# Patient Record
Sex: MALE | Race: WHITE | NOT HISPANIC OR LATINO | URBAN - METROPOLITAN AREA
[De-identification: names, ages, dates, MRNs, and addresses within clinical notes are randomized per-mention and may not be internally consistent; named-entity substitution may affect disease eponyms.]

---

## 2017-02-13 DIAGNOSIS — F64.0 GENDER IDENTITY DISORDER IN ADOLESCENTS OR ADULTS: ICD-10-CM

## 2017-02-13 RX ORDER — SPIRONOLACTONE 100 MG/1
200 TABLET, FILM COATED ORAL DAILY
Qty: 60 TABLET | Refills: 1 | Status: SHIPPED | OUTPATIENT
Start: 2017-02-13 | End: 2017-06-21

## 2017-02-20 ENCOUNTER — OFFICE VISIT (OUTPATIENT)
Dept: OTHER | Facility: OUTPATIENT CENTER | Age: 67
End: 2017-02-20

## 2017-02-20 VITALS
SYSTOLIC BLOOD PRESSURE: 121 MMHG | HEART RATE: 68 BPM | BODY MASS INDEX: 27.8 KG/M2 | DIASTOLIC BLOOD PRESSURE: 74 MMHG | HEIGHT: 68 IN | WEIGHT: 183.4 LBS

## 2017-02-20 DIAGNOSIS — F64.0 GENDER IDENTITY DISORDER IN ADOLESCENTS OR ADULTS: Primary | ICD-10-CM

## 2017-02-20 ASSESSMENT — ENCOUNTER SYMPTOMS
PALPITATIONS: 0
VOMITING: 0
LIGHT-HEADEDNESS: 0
NUMBNESS: 0
POLYDIPSIA: 0
WEAKNESS: 0
HEADACHES: 0
ABDOMINAL PAIN: 0
NERVOUS/ANXIOUS: 0
FREQUENCY: 0
CHILLS: 0
DYSPHORIC MOOD: 0
UNEXPECTED WEIGHT CHANGE: 0
FEVER: 0
CHEST TIGHTNESS: 0
SHORTNESS OF BREATH: 0

## 2017-02-20 ASSESSMENT — PAIN SCALES - GENERAL: PAINLEVEL: NO PAIN (0)

## 2017-02-20 NOTE — MR AVS SNAPSHOT
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      Who to contact     Please call your clinic at [phone] to:    Ask questions about your health    Make or cancel appointments    Discuss your medicines    Learn about your test results    Speak to your doctor   If you have compliments or concerns about an experience at your clinic, or if you wish to file a complaint, please contact [facility] Patient Relations at [phone] or email us at [email or web address]         Additional Information About Your Visit        MyChart Information     AltraVaxt is an electronic gateway that provides easy, online access to your medical records. With [place] [facility], you can request a clinic appointment, read your test results, renew a prescription or communicate with your care team.     To sign up for AltraVaxt visit the website at [email or web address]   You will be asked to enter the access code listed below, as well as some personal information. Please follow the directions to create your username and password.     Your access code is: [email or web address]  Expires: 2017  7:59 AM     Your access code will  in 90 days. If you need help or a new code, please contact your [facility] or call [phone] for assistance.        Care EveryWhere ID     This is your Care EveryWhere ID. This could be used by other organizations to access your [place] medical records  [id number]        Your Vitals Were     Pulse Height BMI (Body Mass  "Index)             68 1.727 m (5' 8\") 27.89 kg/m2          Blood Pressure from Last 3 Encounters:   02/20/17 121/74   05/26/16 123/76   12/31/15 112/70    Weight from Last 3 Encounters:   02/20/17 83.2 kg (183 lb 6.4 oz)   05/26/16 82.1 kg (181 lb)   12/31/15 82.1 kg (181 lb)                 Today's Medication Changes          These changes are accurate as of: 2/20/17 11:59 PM.  If you have any questions, ask your nurse or doctor.               These medicines have changed or have updated prescriptions.        Dose/Directions    simvastatin 40 MG tablet   Commonly known as:  ZOCOR   This may have changed:  how much to take   Used for:  Hypercholesterolemia        Dose:  40 mg   Take 1 tablet (40 mg) by mouth At Bedtime   Quantity:  30 tablet   Refills:  1                Primary Care Provider Office Phone # Fax #    Brady Chavez [phone]       No primary provider on file.        Thank you!     Thank you for choosing [place] [facility]  for your care. Our goal is always to provide you with excellent care. Hearing back from our patients is one way we can continue to improve our services. Please take a few minutes to complete the written survey that you may receive in the mail after your visit with us. Thank you!             Your Updated Medication List - Protect others around you: Learn how to safely use, store and throw away your medicines at www.disposemymeds.org.          This list is accurate as of: 2/20/17 11:59 PM.  Always use your most recent med list.                   Brand Name Dispense Instructions for use    estradiol 0.1 MG/24HR BIW patch    MINIVELLE    24 patch    Place 1 patch onto the skin twice a week       IMITREX PO          simvastatin 40 MG tablet    ZOCOR    30 tablet    Take 1 tablet (40 mg) by mouth At Bedtime       spironolactone 100 MG tablet    ALDACTONE    60 tablet    Take 2 tablets (200 mg) by mouth daily         "

## 2017-02-20 NOTE — NURSING NOTE
"Chief Complaint   Patient presents with     RECHECK       Vitals:    02/20/17 1441   BP: 121/74   Cuff Size: Adult Large   Pulse: 68   Weight: 83.2 kg (183 lb 6.4 oz)   Height: 1.727 m (5' 8\")       Body mass index is 27.89 kg/(m^2).      Shagufta Vicente CMA                        "

## 2017-02-20 NOTE — PROGRESS NOTES
HPI     Ami is a 66 year old individual that uses pronouns She/Her/Hers/Herself that presents today for follow up of:  feminizing hormone therapy.   Gender identity: female .   Started Hormone  therapy  11/2013  Continues on     Vivelle dot 0.1 mg patch 2x/wk    [id number]  mg daily       Any special concerns today?  BP at home running 106-18/73-94; if she feels like bp is running low, will take 100 mg spironolactone rather than 200 mg.   Feels great.   On hormones?  YES +++ Shot day of the week? Not applicable-taking pills/patch/gel      Due for labs?  Yes      +++ Refills of meds needed?  Yes  Gender related body changes since last visit: Skin softer, no much change    Activity: Walking, daily--30-60 min  New health concerns since last visit: No new health concerns since last visit  ---    No past surgical history on file.    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     HTN (hypertension)     Hypercholesterolemia       Current Outpatient Prescriptions   Medication Sig Dispense Refill     spironolactone (ALDACTONE) 100 MG tablet Take 2 tablets (200 mg) by mouth daily 60 tablet 1     estradiol (MINIVELLE) 0.1 MG/24HR patch Place 1 patch onto the skin twice a week 24 patch 3     simvastatin (ZOCOR) 40 MG tablet Take 1 tablet (40 mg) by mouth At Bedtime (Patient taking differently: Take 20 mg by mouth At Bedtime ) 30 tablet 1     SUMAtriptan Succinate (IMITREX PO)          History   Smoking Status     Former Smoker     Packs/day: 0.30   Smokeless Tobacco     Not on file        No Known Allergies    Health Maintenance Due   Topic Date Due     TETANUS IMMUNIZATION (SYSTEM ASSIGNED)  05/05/1969     ADVANCE DIRECTIVE PLANNING Q5 YRS (NO INBASKET)  05/05/1969     HEPATITIS C SCREENING  05/05/1969     COLON CANCER SCREEN (SYSTEM ASSIGNED)  05/05/2001     Mental Health Tx Plan Q11 MOS  06/27/2015     FALL RISK ASSESSMENT  05/05/2016      "PNEUMOCOCCAL (1 of 2 - PCV13) 05/05/2016     AORTIC ANEURYSM SCREENING (SYSTEM ASSIGNED)  05/05/2016     INFLUENZA VACCINE (SYSTEM ASSIGNED)  09/01/2016         Problem, Medication and Allergy Lists were reviewed and are current..         Review of Systems:   Review of Systems   Constitutional: Negative for chills, fever and unexpected weight change.   Eyes: Negative for visual disturbance.   Respiratory: Negative for chest tightness and shortness of breath.    Cardiovascular: Negative for chest pain, palpitations and leg swelling.   Gastrointestinal: Negative for abdominal pain and vomiting.   Endocrine: Negative for polydipsia and polyuria.   Genitourinary: Negative for frequency.   Neurological: Negative for weakness, light-headedness, numbness and headaches.   Psychiatric/Behavioral: Negative for dysphoric mood and suicidal ideas. The patient is not nervous/anxious.             Physical Exam:     Vitals:    02/20/17 1441   BP: 121/74   Cuff Size: Adult Large   Pulse: 68   Weight: 83.2 kg (183 lb 6.4 oz)   Height: 1.727 m (5' 8\")     BMI= Body mass index is 27.89 kg/(m^2).   Wt Readings from Last 10 Encounters:   02/20/17 83.2 kg (183 lb 6.4 oz)   05/26/16 82.1 kg (181 lb)   12/31/15 82.1 kg (181 lb)   05/27/15 79.8 kg (176 lb)   01/22/15 78 kg (172 lb)   08/21/14 76.7 kg (169 lb)   05/29/14 77.1 kg (170 lb)   03/27/14 80.3 kg (177 lb)   12/26/13 78 kg (172 lb)   11/27/13 78.5 kg (173 lb)     Appearance: Female appearance and dress    GENERAL:: healthy, alert and no distress  RESP: lungs clear to auscultation - no rales, no rhonchi, no wheezes  CV: regular rates and rhythm, normal S1 S2, no S3 or S4 and no murmur, no click or rub -  / Breast, hips per flowsheet    Affect: Appropriate/mood-congruent           Labs:   Results from last visit:  Orders Only on 06/02/2016   Component Date Value Ref Range Status     Testosterone Total 06/02/2016 15* 240 - 950 ng/dL Final    Comment: This test was developed and its " performance characteristics determined by the   [facility],  Special Chemistry Laboratory. It has   not been cleared or approved by the FDA. The laboratory is regulated under   CLIA   as qualified to perform high-complexity testing. This test is used for   clinical   purposes. It should not be regarded as investigational or for research.       Sex Hormone Binding Globulin 06/02/2016 68  11 - 80 nmol/L Final     Free Testosterone Calculated 06/02/2016 0.15* 4.7 - 24.4 ng/dL Final     Glucose 06/02/2016 95.0  60.0 - 109.0 mg/dL Final     Urea Nitrogen 06/02/2016 17.0  7.0 - 30.0 mg/dL Final     Calcium 06/02/2016 8.9  8.5 - 10.4 mg/dL Final     Creatinine 06/02/2016 1.4  0.8 - 1.5 mg/dL Final     eGFR Calculated (Non Black Referen* 06/02/2016 54.1* >60.0 Final     eGFR Calculated (Black Reference) 06/02/2016 65.4  >60.0 Final     Sodium 06/02/2016 137.0  133.0 - 144.0 mmol/L Final     Potassium 06/02/2016 4.4  3.4 - 5.3 mmol/L Final     Chloride 06/02/2016 102.0  94.0 - 109.0 mmol/L Final     Carbon Dioxide 06/02/2016 26.0  20.0 - 32.0 mmol/L Final     Albumin 06/02/2016 3.9  3.2 - 4.5 g/dL Final     Alkaline Phosphatase 06/02/2016 76.0  40.0 - 150.0 U/L Final     ALT 06/02/2016 22.0  0.0 - 70.0 U/L Final     AST 06/02/2016 24.0  0.0 - 55.0 U/L Final     Bilirubin Total 06/02/2016 0.7  0.2 - 1.3 mg/dL Final     Protein Total 06/02/2016 7.6  6.8 - 8.8 g/dL Final     FASTING SPECIMEN 06/02/2016 Yes    Final     Cholesterol 06/02/2016 166.0  0.0 - 200.0 Final     HDL Cholesterol 06/02/2016 44.0  >40.0 Final     Triglycerides 06/02/2016 162.0* 0.0 - 150.0 Final     Cholesterol/HDL Ratio 06/02/2016 3.8  0.0 - 5.0 Final     LDL Cholesterol Direct 06/02/2016 90.0  0.0 - 129.0 Final     VLDL-Cholesterol 06/02/2016 32.0  7.0 - 32.0 Final       Assessment and Plan     1. Gender dysphoria  Doing well on current hormone regimen. Discussed orchiectomy as a surgical option for patient, as she feels  vaginoplasty would be more surgically complex and invasive than she wants for herself at this point.       Health Maintenance:  Mammogram             When:never, counseled to start in next year    Follow up:  Follow up in 6 months.  Results by javier  Questions were elicited and answered.     Anish Xiao MD

## 2017-05-08 DIAGNOSIS — F64.0 GENDER IDENTITY DISORDER IN ADOLESCENTS OR ADULTS: ICD-10-CM

## 2017-05-08 RX ORDER — ESTRADIOL 0.1 MG/D
1 FILM, EXTENDED RELEASE TRANSDERMAL
Qty: 24 PATCH | Refills: 3 | Status: SHIPPED | OUTPATIENT
Start: 2017-05-08 | End: 2017-08-31

## 2017-06-13 ENCOUNTER — TELEPHONE (OUTPATIENT)
Dept: OTHER | Facility: OUTPATIENT CENTER | Age: 67
End: 2017-06-13

## 2017-06-21 DIAGNOSIS — F64.0 GENDER IDENTITY DISORDER IN ADOLESCENTS OR ADULTS: ICD-10-CM

## 2017-06-21 RX ORDER — SPIRONOLACTONE 100 MG/1
200 TABLET, FILM COATED ORAL DAILY
Qty: 60 TABLET | Refills: 1 | Status: SHIPPED | OUTPATIENT
Start: 2017-06-21 | End: 2017-08-31

## 2017-08-31 ENCOUNTER — OFFICE VISIT (OUTPATIENT)
Dept: OTHER | Facility: OUTPATIENT CENTER | Age: 67
End: 2017-08-31

## 2017-08-31 VITALS
HEART RATE: 63 BPM | BODY MASS INDEX: 28.79 KG/M2 | WEIGHT: 190 LBS | DIASTOLIC BLOOD PRESSURE: 76 MMHG | SYSTOLIC BLOOD PRESSURE: 137 MMHG | HEIGHT: 68 IN

## 2017-08-31 DIAGNOSIS — F64.0 GENDER IDENTITY DISORDER IN ADOLESCENTS OR ADULTS: ICD-10-CM

## 2017-08-31 RX ORDER — ESTRADIOL 0.1 MG/D
1 FILM, EXTENDED RELEASE TRANSDERMAL
Qty: 24 PATCH | Refills: 3 | Status: SHIPPED | OUTPATIENT
Start: 2017-08-31 | End: 2018-04-16

## 2017-08-31 RX ORDER — SPIRONOLACTONE 100 MG/1
100 TABLET, FILM COATED ORAL DAILY
Qty: 90 TABLET | Refills: 1 | Status: SHIPPED | OUTPATIENT
Start: 2017-08-31 | End: 2018-04-16

## 2017-08-31 ASSESSMENT — ENCOUNTER SYMPTOMS
CHEST TIGHTNESS: 0
VOMITING: 0
DYSPHORIC MOOD: 0
FEVER: 0
ABDOMINAL PAIN: 0
NERVOUS/ANXIOUS: 0
CHILLS: 0
WEAKNESS: 0
HEADACHES: 0
FREQUENCY: 0
POLYDIPSIA: 0
LIGHT-HEADEDNESS: 0
SHORTNESS OF BREATH: 0
PALPITATIONS: 0
NUMBNESS: 0
UNEXPECTED WEIGHT CHANGE: 1

## 2017-08-31 NOTE — PROGRESS NOTES
HPI     Ami is a 67 year old individual that uses pronouns She/Her/Hers/Herself that presents today for follow up of:  feminizing hormone therapy.   Gender identity: female.   Started Hormone  therapy  *11/2013  Continues on    Vivelle dot 0.1 mg patch 2x/wk    Spironlactone 100* mg daily        Any special concerns today?  concerns about:  Pt. Only taking 100 mg spironolactone as was getting lightheaded, bp was a little low, told to decrease to 100 mg by either me or PCP, this change 1 year ago.   Had Mammogram and was told was fine, but never got a result letter; and result of study was never sent to [facility] 7/2017 ([facility])  She forgot to do labs ordered from last visit. Otherwise doing well.    On hormones?  YES +++ Shot day of the week? Not applicable-taking pills/patch/gel      Due for labs?  Yes      +++ Refills of meds needed?  Yes  Gender related body changes since last visit: Heavier in hip area; weight gain--cooking more, may be eating more.     Activity: walk as much possible; mowing lawn. Daily walk 20-60 min  New health concerns since last visit: none  ---    No past surgical history on file.    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     HTN (hypertension)     Hypercholesterolemia       Current Outpatient Prescriptions   Medication Sig Dispense Refill     spironolactone (ALDACTONE) 100 MG tablet Take 2 tablets (200 mg) by mouth daily 60 tablet 1     estradiol (MINIVELLE) 0.1 MG/24HR BIW patch Place 1 patch onto the skin twice a week 24 patch 3     simvastatin (ZOCOR) 40 MG tablet Take 1 tablet (40 mg) by mouth At Bedtime (Patient taking differently: Take 20 mg by mouth At Bedtime ) 30 tablet 1     SUMAtriptan Succinate (IMITREX PO)          History   Smoking Status     Former Smoker     Packs/day: 0.30   Smokeless Tobacco     Not on file        No Known Allergies    Health Maintenance Due   Topic Date Due     TETANUS  "IMMUNIZATION (SYSTEM ASSIGNED)  05/05/1969     HEPATITIS C SCREENING  05/05/1969     MAMMO Q1 YR  05/05/1970     COLON CANCER SCREEN (SYSTEM ASSIGNED)  05/05/2001     ADVANCE DIRECTIVE PLANNING Q5 YRS  05/05/2006     Mental Health Tx Plan Q11 MOS  06/27/2015     FALL RISK ASSESSMENT  05/05/2016     PNEUMOCOCCAL (1 of 2 - PCV13) 05/05/2016     AORTIC ANEURYSM SCREENING (SYSTEM ASSIGNED)  05/05/2016         Problem, Medication and Allergy Lists were reviewed and are current..         Review of Systems:   Review of Systems   Constitutional: Positive for unexpected weight change. Negative for chills and fever.   Eyes: Negative for visual disturbance.   Respiratory: Negative for chest tightness and shortness of breath.    Cardiovascular: Negative for chest pain, palpitations and leg swelling.   Gastrointestinal: Negative for abdominal pain and vomiting.   Endocrine: Negative for polydipsia and polyuria.   Genitourinary: Negative for frequency.   Neurological: Negative for weakness, light-headedness, numbness and headaches.        Unchanged migraines, not needing Imitrex   Psychiatric/Behavioral: Negative for dysphoric mood and suicidal ideas. The patient is not nervous/anxious.             Physical Exam:     Vitals:    08/31/17 1351   BP: 137/76   Pulse: 63   Weight: 86.2 kg (190 lb)   Height: 1.727 m (5' 8\")     BMI= Body mass index is 28.89 kg/(m^2).   Wt Readings from Last 10 Encounters:   08/31/17 86.2 kg (190 lb)   02/20/17 83.2 kg (183 lb 6.4 oz)   05/26/16 82.1 kg (181 lb)   12/31/15 82.1 kg (181 lb)   05/27/15 79.8 kg (176 lb)   01/22/15 78 kg (172 lb)   08/21/14 76.7 kg (169 lb)   05/29/14 77.1 kg (170 lb)   03/27/14 80.3 kg (177 lb)   12/26/13 78 kg (172 lb)     Appearance: Female appearance and dress    GENERAL:: healthy, alert and no distress  RESP: lungs clear to auscultation - no rales, no rhonchi, no wheezes  CV: regular rates and rhythm, normal S1 S2, no S3 or S4 and no murmur, no click or rub -  Voice, " skin/ Breast, hips per flowsheet    Affect: Appropriate/mood-congruent           Labs:   Results from last visit:  Orders Only on 06/02/2016   Component Date Value Ref Range Status     Testosterone Total 06/02/2016 15* 240 - 950 ng/dL Final    Comment: This test was developed and its performance characteristics determined by the   [facility],  Special Chemistry Laboratory. It has   not been cleared or approved by the FDA. The laboratory is regulated under   CLIA   as qualified to perform high-complexity testing. This test is used for   clinical   purposes. It should not be regarded as investigational or for research.       Sex Hormone Binding Globulin 06/02/2016 68  11 - 80 nmol/L Final     Free Testosterone Calculated 06/02/2016 0.15* 4.7 - 24.4 ng/dL Final     Glucose 06/02/2016 95.0  60.0 - 109.0 mg/dL Final     Urea Nitrogen 06/02/2016 17.0  7.0 - 30.0 mg/dL Final     Calcium 06/02/2016 8.9  8.5 - 10.4 mg/dL Final     Creatinine 06/02/2016 1.4  0.8 - 1.5 mg/dL Final     eGFR Calculated (Non Black Referen* 06/02/2016 54.1* >60.0 Final     eGFR Calculated (Black Reference) 06/02/2016 65.4  >60.0 Final     Sodium 06/02/2016 137.0  133.0 - 144.0 mmol/L Final     Potassium 06/02/2016 4.4  3.4 - 5.3 mmol/L Final     Chloride 06/02/2016 102.0  94.0 - 109.0 mmol/L Final     Carbon Dioxide 06/02/2016 26.0  20.0 - 32.0 mmol/L Final     Albumin 06/02/2016 3.9  3.2 - 4.5 g/dL Final     Alkaline Phosphatase 06/02/2016 76.0  40.0 - 150.0 U/L Final     ALT 06/02/2016 22.0  0.0 - 70.0 U/L Final     AST 06/02/2016 24.0  0.0 - 55.0 U/L Final     Bilirubin Total 06/02/2016 0.7  0.2 - 1.3 mg/dL Final     Protein Total 06/02/2016 7.6  6.8 - 8.8 g/dL Final     FASTING SPECIMEN 06/02/2016 Yes    Final     Cholesterol 06/02/2016 166.0  0.0 - 200.0 Final     HDL Cholesterol 06/02/2016 44.0  >40.0 Final     Triglycerides 06/02/2016 162.0* 0.0 - 150.0 Final     Cholesterol/HDL Ratio 06/02/2016 3.8  0.0 - 5.0  "Final     LDL Cholesterol Direct 06/02/2016 90.0  0.0 - 129.0 Final     VLDL-Cholesterol 06/02/2016 32.0  7.0 - 32.0 Final       Assessment and Plan   1. Gender dysphoria  Stable feminization on current regimen  Pt. Interested in breast augmentation, reviewed process for this.   To previously ordered labs.  Counseled patient regarding weight loss skills-- minizmize snakcing, loook at portion size,\" 1/2now 1/2 later\"  Reduce carbs  Contraception:   not needed      Follow up:  Follow up in 6 months.  [facility]  Questions were elicited and answered.     Anish Xiao MD    "

## 2017-08-31 NOTE — MR AVS SNAPSHOT
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      Your next 10 appointments already scheduled     Sep 13, 2017 11:00 AM CDT   LAB VISIT with [facility]   [facility])    [address], [address]  [facility] [address]   [phone]           If you are coming in for fasting labs, you will need to fast for 10-12 hours prior to your appt. Fasting labs include lipids, cholesterol, glucose, complete metabolic panel, basic metabolic panel, and triglycerides. Do not drink coffee or any other fluids. Water with medications are okay. Do not chew gum as well. If you have any further questions, please contact your health care team.                Who to contact     Please call your clinic at [phone] to:    Ask questions about your health    Make or cancel appointments    Discuss your medicines    Learn about your test results    Speak to your doctor   If you have compliments or concerns about an experience at your clinic, or if you wish to file a complaint, please contact [facility] Patient Relations at [phone] or email us at [email or web address]         Additional Information About Your Visit        MyChart Information     [employer] is an electronic gateway that provides easy, online access to your medical records. With [employer], you can request a clinic appointment, read your test results, renew a prescription or communicate with your care team.     To sign up for [employer] visit the website at  "[email or web address]   You will be asked to enter the access code listed below, as well as some personal information. Please follow the directions to create your username and password.     Your access code is: [email or web address]  Expires: 2017  5:28 PM     Your access code will  in 90 days. If you need help or a new code, please contact your [facility] or call [phone] for assistance.        Care EveryWhere ID     This is your Care EveryWhere ID. This could be used by other organizations to access your [place] medical records  [id number]        Your Vitals Were     Pulse Height BMI (Body Mass Index)             63 1.727 m (5' 8\") 28.89 kg/m2          Blood Pressure from Last 3 Encounters:   17 137/76   17 121/74   16 123/76    Weight from Last 3 Encounters:   17 86.2 kg (190 lb)   17 83.2 kg (183 lb 6.4 oz)   16 82.1 kg (181 lb)              Today, you had the following     No orders found for display         Today's Medication Changes          These changes are accurate as of: 17 11:59 PM.  If you have any questions, ask your nurse or doctor.               These medicines have changed or have updated prescriptions.        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        Dose:  100 mg   Take 1 tablet (100 mg) by mouth daily   Quantity:  90 tablet   Refills:  1            Where to get your medicines      These medications were sent to [facility] [place], [place] - [address]  [address] [place] [address]     Phone: "  [phone]     estradiol 0.1 MG/24HR BIW patch    spironolactone 100 MG tablet                Primary Care Provider Office Phone # Fax #    Brady Chavez [phone]       [facility] AND [address] [address]        Equal Access to Services     CHERY BURNS : Hadii aad ku hadasho Soomaali, waaxda luqadaha, qaybta kaalmada adeegyada, waxjae sandersin haysilvian chris guerrakyjames boles. So [facility] [phone].    ATENCIÓN: Si habla español, tiene a perez disposición servicios gratuitos de asistencia lingüística. [facility] [phone].    We comply with applicable federal civil rights laws and [place] laws. We do not discriminate on the basis of race, color, national origin, age, disability sex, sexual orientation or gender identity.            Thank you!     Thank you for choosing [place] [facility]  for your care. Our goal is always to provide you with excellent care. Hearing back from our patients is one way we can continue to improve our services. Please take a few minutes to complete the written survey that you may receive in the mail after your visit with us. Thank you!             Your Updated Medication List - Protect others around you: Learn how to safely use, store and throw away your medicines at www.disposemymeds.org.          This list is accurate as of: 8/31/17 11:59 PM.  Always use your most recent med list.                   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

## 2017-09-13 DIAGNOSIS — F64.0 GENDER IDENTITY DISORDER IN ADOLESCENTS OR ADULTS: ICD-10-CM

## 2017-09-13 LAB
ALBUMIN SERPL-MCNC: 3.8 G/DL (ref 3.4–5)
ALP SERPL-CCNC: 93 U/L (ref 40–150)
ALT SERPL W P-5'-P-CCNC: 23 U/L (ref 0–70)
ANION GAP SERPL CALCULATED.3IONS-SCNC: 8 MMOL/L (ref 3–14)
AST SERPL W P-5'-P-CCNC: 19 U/L (ref 0–45)
BILIRUB SERPL-MCNC: 0.5 MG/DL (ref 0.2–1.3)
BUN SERPL-MCNC: 14 MG/DL (ref 7–30)
CALCIUM SERPL-MCNC: 8.9 MG/DL (ref 8.5–10.1)
CHLORIDE SERPL-SCNC: 104 MMOL/L (ref 94–109)
CHOLEST SERPL-MCNC: 164 MG/DL
CO2 SERPL-SCNC: 23 MMOL/L (ref 20–32)
CREAT SERPL-MCNC: 1.16 MG/DL (ref 0.66–1.25)
GFR SERPL CREATININE-BSD FRML MDRD: 63 ML/MIN/1.7M2
GLUCOSE SERPL-MCNC: 83 MG/DL (ref 70–99)
HDLC SERPL-MCNC: 39 MG/DL
LDLC SERPL CALC-MCNC: 91 MG/DL
NONHDLC SERPL-MCNC: 125 MG/DL
POTASSIUM SERPL-SCNC: 4.8 MMOL/L (ref 3.4–5.3)
PROT SERPL-MCNC: 7 G/DL (ref 6.8–8.8)
SODIUM SERPL-SCNC: 134 MMOL/L (ref 133–144)
TRIGL SERPL-MCNC: 168 MG/DL

## 2017-09-14 LAB
SHBG SERPL-SCNC: 93 NMOL/L (ref 11–80)
TESTOST FREE SERPL-MCNC: 1.46 NG/DL (ref 4.7–24.4)
TESTOST SERPL-MCNC: 166 NG/DL (ref 240–950)

## 2018-04-16 ENCOUNTER — TELEPHONE (OUTPATIENT)
Dept: OTHER | Facility: OUTPATIENT CENTER | Age: 68
End: 2018-04-16

## 2018-04-16 DIAGNOSIS — F64.0 GENDER IDENTITY DISORDER IN ADOLESCENTS OR ADULTS: ICD-10-CM

## 2018-04-16 RX ORDER — SPIRONOLACTONE 100 MG/1
100 TABLET, FILM COATED ORAL DAILY
Qty: 90 TABLET | Refills: 0 | Status: SHIPPED | OUTPATIENT
Start: 2018-04-16 | End: 2018-07-16

## 2018-04-16 RX ORDER — ESTRADIOL 0.1 MG/D
1 FILM, EXTENDED RELEASE TRANSDERMAL
Qty: 24 PATCH | Refills: 0 | Status: SHIPPED | OUTPATIENT
Start: 2018-04-16 | End: 2018-05-10

## 2018-04-16 NOTE — TELEPHONE ENCOUNTER
Requested Medication:  MINIVELLE 0.1MG/24HR PTTW    PLACE ONE PATCH ONTO THE SKIN TWICE A WEEK    Dose:   0.1MG  Quantity:  24  Refills:     Last seen at [facility]:  8/31/17 Dr. Anish Xiao  Next Appointment with Provider:  5/10/18

## 2018-04-16 NOTE — TELEPHONE ENCOUNTER
Requested Medication:  SPIRONOLACTONE  TALE ONE TABLET BY MOUTH EVERY DAY  Dose:   100 MG  Quantity:  90   Refills:      Last seen at [facility]: 8/31/17  Next Appointment with Provider:  5/10/18

## 2018-05-10 ENCOUNTER — OFFICE VISIT (OUTPATIENT)
Dept: OTHER | Facility: OUTPATIENT CENTER | Age: 68
End: 2018-05-10
Payer: COMMERCIAL

## 2018-05-10 VITALS
HEIGHT: 68 IN | SYSTOLIC BLOOD PRESSURE: 139 MMHG | DIASTOLIC BLOOD PRESSURE: 80 MMHG | HEART RATE: 60 BPM | WEIGHT: 187 LBS | BODY MASS INDEX: 28.34 KG/M2

## 2018-05-10 DIAGNOSIS — F64.0 GENDER IDENTITY DISORDER IN ADOLESCENTS OR ADULTS: ICD-10-CM

## 2018-05-10 RX ORDER — ATORVASTATIN CALCIUM 40 MG/1
TABLET, FILM COATED ORAL
COMMUNITY
Start: 2017-12-07 | End: 2018-05-10

## 2018-05-10 RX ORDER — ESTRADIOL 0.1 MG/D
1 FILM, EXTENDED RELEASE TRANSDERMAL
Qty: 24 PATCH | Refills: 1 | Status: SHIPPED | OUTPATIENT
Start: 2018-05-10 | End: 2018-12-06

## 2018-05-10 ASSESSMENT — ENCOUNTER SYMPTOMS
CHEST TIGHTNESS: 0
NAUSEA: 0
WEAKNESS: 0
CHILLS: 0
NUMBNESS: 0
PALPITATIONS: 0
DYSPHORIC MOOD: 0
FEVER: 0
FREQUENCY: 0
UNEXPECTED WEIGHT CHANGE: 0
ABDOMINAL PAIN: 0
VOMITING: 0
NERVOUS/ANXIOUS: 0
SHORTNESS OF BREATH: 0
LIGHT-HEADEDNESS: 0
POLYDIPSIA: 0
HEADACHES: 0

## 2018-05-10 NOTE — MR AVS SNAPSHOT
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      Who to contact     Please call your clinic at [phone] to:    Ask questions about your health    Make or cancel appointments    Discuss your medicines    Learn about your test results    Speak to your doctor            Additional Information About Your Visit        MyChart Information     Digbyt is an electronic gateway that provides easy, online access to your medical records. With [facility], you can request a clinic appointment, read your test results, renew a prescription or communicate with your care team.     To sign up for Digbyt visit the website at [email or web address]   You will be asked to enter the access code listed below, as well as some personal information. Please follow the directions to create your username and password.     Your access code is: [email or web address]  Expires: 2018  2:53 PM     Your access code will  in 90 days. If you need help or a new code, please contact your [facility] or call [phone] for assistance.        Care EveryWhere ID     This is your Care EveryWhere ID. This could be used by other organizations to access your [facility] medical records  [id number]        Your Vitals Were     Pulse Height BMI (Body Mass Index)             60 1.715 m (5' 7.5\") 28.86 kg/m2          Blood Pressure from Last 3 Encounters:   05/10/18 139/80   17 137/76   17 121/74    Weight from Last 3 Encounters:   05/10/18 84.8 kg (187 lb)   17 86.2 kg (190 lb) "   02/20/17 83.2 kg (183 lb 6.4 oz)              Today, you had the following     No orders found for display         Today's Medication Changes          These changes are accurate as of 5/10/18 11:59 PM.  If you have any questions, ask your nurse or doctor.               These medicines have changed or have updated prescriptions.        Dose/Directions    simvastatin 40 MG tablet   Commonly known as:  ZOCOR   This may have changed:  how much to take   Used for:  Hypercholesterolemia        Dose:  40 mg   Take 1 tablet (40 mg) by mouth At Bedtime   Quantity:  30 tablet   Refills:  1            Where to get your medicines      These medications were sent to [facility] [address], [place] [address]     Phone:  [phone]     estradiol 0.1 MG/24HR BIW patch                Primary Care Provider Office Phone # Fax #    Brady Chavez [phone]       [facility] AND [place] [address]        Equal Access to Services     CHERY BURNS AH: Hadii arturo everett hadasho Soomaali, waaxda luqadaha, qaybta kaalmada adeegyada, griselda boles. So [facility] [phone].    ATENCIÓN: Si habla español, tiene a perez disposición servicios gratuitos de asistencia lingüística. Llame al [phone].    We comply with applicable federal civil rights laws and [place] laws. We do not discriminate on the basis of race, color, national origin, age, disability, sex, sexual orientation, or gender identity.            Thank you!     Thank you for choosing [facility]  for your care. Our goal is always to provide you with excellent care. Hearing back from our patients is one way we can continue to improve our services. Please take a few minutes to complete the written survey that you may receive in the mail after your visit with us. Thank you!             Your Updated Medication List - Protect others  around you: Learn how to safely use, store and throw away your medicines at www.disposemymeds.org.          This list is accurate as of 5/10/18 11:59 PM.  Always use your most recent med list.                   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

## 2018-05-10 NOTE — NURSING NOTE
"Chief Complaint   Patient presents with     RECHECK     TG       Vitals:    05/10/18 1325   BP: 139/80   Pulse: 60   Weight: 84.8 kg (187 lb)   Height: 1.715 m (5' 7.5\")       Body mass index is 28.86 kg/(m^2).      Shagufta Vicente CMA    "

## 2018-05-10 NOTE — PROGRESS NOTES
HPI     Ami is a 68 year old individual that uses pronouns She/Her/Hers/Herself that presents today for follow up of:  feminizing hormone therapy.   Gender identity: female.   Started Hormone  therapy  11/2013  Continues on. Vivelle dot 0.1 mg patch 2x/wk    Spironlactone 100* mg daily      .  Any special concerns today?  Last seen 8/2017  Has not been able to check bp at home; is been needing to check on aunt before going to work, so life more stressful lately  By day patch needs replacing, it is loose, especially in center of patch.    On hormones?  YES +++ Shot day of the week? Not applicable-taking pills/patch/gel      Due for labs?  Yes      +++ Refills of meds needed?  Yes  Gender related body changes since last visit:   She has lost a little weight, no other changes    Breakthrough bleeding? Does Not Apply  Activity: walking 1-2x/week 6 blocks  New health concerns since last visit:  None, feeling good  ---    No past surgical history on file.    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     HTN (hypertension)     Hypercholesterolemia     Hip fracture, left (H)     Rheumatic fever       Current Outpatient Prescriptions   Medication Sig Dispense Refill     simvastatin (ZOCOR) 40 MG tablet Take 1 tablet (40 mg) by mouth At Bedtime (Patient taking differently: Take 20 mg by mouth At Bedtime ) 30 tablet 1     SUMAtriptan Succinate (IMITREX PO)        estradiol (MINIVELLE) 0.1 MG/24HR BIW patch Place 1 patch onto the skin twice a week 24 patch 0     spironolactone (ALDACTONE) 100 MG tablet Take 1 tablet (100 mg) by mouth daily 90 tablet 0       History   Smoking Status     Former Smoker     Packs/day: 0.30   Smokeless Tobacco     Never Used          Allergies   Allergen Reactions     Penicillins      Other reaction(s): Other, see comments  fainting       Health Maintenance Due   Topic Date Due     TETANUS IMMUNIZATION (SYSTEM ASSIGNED)   "05/05/1969     HEPATITIS C SCREENING  05/05/1969     MAMMO Q1 YR  05/05/1970     COLON CANCER SCREEN (SYSTEM ASSIGNED)  05/05/2001     ADVANCE DIRECTIVE PLANNING Q5 YRS  05/05/2006     Mental Health Tx Plan Q11 MOS  06/27/2015     FALL RISK ASSESSMENT  05/05/2016     PNEUMOCOCCAL (1 of 2 - PCV13) 05/05/2016     AORTIC ANEURYSM SCREENING (SYSTEM ASSIGNED)  05/05/2016         Problem, Medication and Allergy Lists were reviewed and are current..         Review of Systems:   Review of Systems   Constitutional: Negative for chills, fever and unexpected weight change.   Eyes: Negative for visual disturbance.   Respiratory: Negative for chest tightness and shortness of breath.    Cardiovascular: Negative for chest pain, palpitations and leg swelling.   Gastrointestinal: Negative for abdominal pain, nausea and vomiting.   Endocrine: Negative for polydipsia and polyuria.   Genitourinary: Negative for frequency.   Neurological: Negative for weakness, light-headedness, numbness and headaches.   Psychiatric/Behavioral: Negative for dysphoric mood and suicidal ideas. The patient is not nervous/anxious.             Physical Exam:     Vitals:    05/10/18 1325   BP: 139/80   Pulse: 60   Weight: 84.8 kg (187 lb)   Height: 1.715 m (5' 7.5\")     BMI= Body mass index is 28.86 kg/(m^2).   Wt Readings from Last 10 Encounters:   05/10/18 84.8 kg (187 lb)   08/31/17 86.2 kg (190 lb)   02/20/17 83.2 kg (183 lb 6.4 oz)   05/26/16 82.1 kg (181 lb)   12/31/15 82.1 kg (181 lb)   05/27/15 79.8 kg (176 lb)   01/22/15 78 kg (172 lb)   08/21/14 76.7 kg (169 lb)   05/29/14 77.1 kg (170 lb)   03/27/14 80.3 kg (177 lb)     Appearance: Female appearance and dress    GENERAL:: healthy, alert and no distress  RESP: lungs clear to auscultation - no rales, no rhonchi, no wheezes  CV: regular rates and rhythm, normal S1 S2, no S3 or S4 and no murmur, no click or rub -  Affect: Appropriate/mood-congruent           Labs:   Results from last visit:  Orders " Only on 09/13/2017   Component Date Value Ref Range Status     Testosterone Total 09/13/2017 166* 240 - 950 ng/dL Final    Comment: This test was developed and its performance characteristics determined by the   [facility],  Special Chemistry Laboratory. It has   not been cleared or approved by the FDA. The laboratory is regulated under   CLIA as qualified to perform high-complexity testing. This test is used for   clinical purposes. It should not be regarded as investigational or for   research.       Sex Hormone Binding Globulin 09/13/2017 93* 11 - 80 nmol/L Final     Free Testosterone Calculated 09/13/2017 1.46* 4.7 - 24.4 ng/dL Final     Sodium 09/13/2017 134  133 - 144 mmol/L Final     Potassium 09/13/2017 4.8  3.4 - 5.3 mmol/L Final     Chloride 09/13/2017 104  94 - 109 mmol/L Final     Carbon Dioxide 09/13/2017 23  20 - 32 mmol/L Final     Anion Gap 09/13/2017 8  3 - 14 mmol/L Final     Glucose 09/13/2017 83  70 - 99 mg/dL Final     Urea Nitrogen 09/13/2017 14  7 - 30 mg/dL Final     Creatinine 09/13/2017 1.16  0.66 - 1.25 mg/dL Final     GFR Estimate 09/13/2017 63  >60 mL/min/1.7m2 Final    Non  GFR Calc     GFR Estimate If Black 09/13/2017 76  >60 mL/min/1.7m2 Final    African American GFR Calc     Calcium 09/13/2017 8.9  8.5 - 10.1 mg/dL Final     Bilirubin Total 09/13/2017 0.5  0.2 - 1.3 mg/dL Final     Albumin 09/13/2017 3.8  3.4 - 5.0 g/dL Final     Protein Total 09/13/2017 7.0  6.8 - 8.8 g/dL Final     Alkaline Phosphatase 09/13/2017 93  40 - 150 U/L Final     ALT 09/13/2017 23  0 - 70 U/L Final     AST 09/13/2017 19  0 - 45 U/L Final     Cholesterol 09/13/2017 164  <200 mg/dL Final     Triglycerides 09/13/2017 168* <150 mg/dL Final    Comment: Borderline high:  150-199 mg/dl  High:             200-499 mg/dl  Very high:       >499 mg/dl       HDL Cholesterol 09/13/2017 39* >39 mg/dL Final     LDL Cholesterol Calculated 09/13/2017 91  <100 mg/dL Final     Desirable:       <100 mg/dl     Non HDL Cholesterol 09/13/2017 125  <130 mg/dL Final     Previously suppressed testosterone on this dose of TD estradiol    Assessment and Plan   1. Gender dysphoria  Repeat testosterone, reminded patient to not keep keep old patch on when start new patch   2. HTN --borderline bp, check at home. Goal is 130/80 or less  To  do labs at next visit, repeat testosterone with these labs.    Follow up:  Follow up in 6 months.  Results by mychart  Questions were elicited and answered.     Anish Xiao MD

## 2018-07-12 ENCOUNTER — CARE COORDINATION (OUTPATIENT)
Dept: OTHER | Facility: OUTPATIENT CENTER | Age: 68
End: 2018-07-12

## 2018-07-12 NOTE — PROGRESS NOTES
Approved: Estradiol Patch Bi Weekly    4/13/2018 - 7/12/2019      Shagufta Vicente,[facility]

## 2018-07-16 DIAGNOSIS — F64.0 GENDER IDENTITY DISORDER IN ADOLESCENTS OR ADULTS: ICD-10-CM

## 2018-07-16 RX ORDER — SPIRONOLACTONE 100 MG/1
100 TABLET, FILM COATED ORAL DAILY
Qty: 90 TABLET | Refills: 0 | Status: SHIPPED | OUTPATIENT
Start: 2018-07-16 | End: 2018-10-12

## 2018-07-16 NOTE — TELEPHONE ENCOUNTER
Requested Medication:  Spironolactone  Dose:   100 MG  Quantity:  90  Refills:  0    Take 1 tablet (100 MG) daily    Last seen at [facility]:  5/10 - 6 mo follow up  Next Appointment with Provider:  Visit date not found      Shagufta Vicente CMA

## 2018-10-12 DIAGNOSIS — F64.0 GENDER IDENTITY DISORDER IN ADOLESCENTS OR ADULTS: ICD-10-CM

## 2018-10-12 NOTE — TELEPHONE ENCOUNTER
Requested Medication:  Spironolactone 100mg tabs    Take one tablet by mouth every day  Dose:  100mg  Quantity:  90  Refills:      Last seen at [facility]:  May 2018 Dr. Anish Xiao  Next Appointment with Provider:  Visit date not found

## 2018-10-15 RX ORDER — SPIRONOLACTONE 100 MG/1
100 TABLET, FILM COATED ORAL DAILY
Qty: 90 TABLET | Refills: 0 | Status: SHIPPED | OUTPATIENT
Start: 2018-10-15 | End: 2018-12-06

## 2018-11-06 DIAGNOSIS — F64.0 GENDER IDENTITY DISORDER IN ADOLESCENTS OR ADULTS: Primary | ICD-10-CM

## 2018-11-12 DIAGNOSIS — F64.0 GENDER DYSPHORIA IN ADOLESCENT AND ADULT: Primary | ICD-10-CM

## 2018-11-20 DIAGNOSIS — F64.0 GENDER DYSPHORIA IN ADOLESCENT AND ADULT: ICD-10-CM

## 2018-11-20 LAB
ALBUMIN SERPL-MCNC: 4 G/DL (ref 3.4–5)
ALP SERPL-CCNC: 107 U/L (ref 40–150)
ALT SERPL W P-5'-P-CCNC: 22 U/L (ref 0–70)
ANION GAP SERPL CALCULATED.3IONS-SCNC: 7 MMOL/L (ref 3–14)
AST SERPL W P-5'-P-CCNC: 19 U/L (ref 0–45)
BILIRUB SERPL-MCNC: 0.6 MG/DL (ref 0.2–1.3)
BUN SERPL-MCNC: 20 MG/DL (ref 7–30)
CALCIUM SERPL-MCNC: 8.9 MG/DL (ref 8.5–10.1)
CHLORIDE SERPL-SCNC: 102 MMOL/L (ref 94–109)
CHOLEST SERPL-MCNC: 183 MG/DL (ref 0–200)
CHOLEST/HDLC SERPL: 4.1 {RATIO} (ref 0–5)
CO2 SERPL-SCNC: 24 MMOL/L (ref 20–32)
CREAT SERPL-MCNC: 1.24 MG/DL (ref 0.66–1.25)
FASTING SPECIMEN: YES
GFR SERPL CREATININE-BSD FRML MDRD: 58 ML/MIN/1.7M2
GLUCOSE SERPL-MCNC: 88 MG/DL (ref 70–99)
HDLC SERPL-MCNC: 45 MG/DL
LDLC SERPL CALC-MCNC: 99 MG/DL (ref 0–129)
POTASSIUM SERPL-SCNC: 4.5 MMOL/L (ref 3.4–5.3)
PROT SERPL-MCNC: 7.6 G/DL (ref 6.8–8.8)
SODIUM SERPL-SCNC: 134 MMOL/L (ref 133–144)
TRIGL SERPL-MCNC: 198 MG/DL (ref 0–150)
VLDL-CHOLESTEROL: 40 (ref 7–32)

## 2018-11-22 LAB
SHBG SERPL-SCNC: 79 NMOL/L (ref 11–80)
TESTOST FREE SERPL-MCNC: 0.36 NG/DL (ref 4.7–24.4)
TESTOST SERPL-MCNC: 38 NG/DL (ref 240–950)

## 2018-12-06 ENCOUNTER — OFFICE VISIT (OUTPATIENT)
Dept: OTHER | Facility: OUTPATIENT CENTER | Age: 68
End: 2018-12-06

## 2018-12-06 VITALS
WEIGHT: 185 LBS | HEART RATE: 68 BPM | HEIGHT: 68 IN | SYSTOLIC BLOOD PRESSURE: 125 MMHG | DIASTOLIC BLOOD PRESSURE: 83 MMHG | BODY MASS INDEX: 28.04 KG/M2 | TEMPERATURE: 97.1 F

## 2018-12-06 DIAGNOSIS — Z13.820 SCREENING FOR OSTEOPOROSIS: Primary | ICD-10-CM

## 2018-12-06 DIAGNOSIS — F64.0 GENDER IDENTITY DISORDER IN ADOLESCENTS OR ADULTS: ICD-10-CM

## 2018-12-06 RX ORDER — ESTRADIOL 0.1 MG/D
1 FILM, EXTENDED RELEASE TRANSDERMAL
Qty: 24 PATCH | Refills: 1 | Status: SHIPPED | OUTPATIENT
Start: 2018-12-06 | End: 2019-06-14

## 2018-12-06 RX ORDER — SPIRONOLACTONE 100 MG/1
100 TABLET, FILM COATED ORAL DAILY
Qty: 90 TABLET | Refills: 1 | Status: SHIPPED | OUTPATIENT
Start: 2018-12-06 | End: 2019-07-24

## 2018-12-06 ASSESSMENT — ENCOUNTER SYMPTOMS
NERVOUS/ANXIOUS: 0
FEVER: 0
UNEXPECTED WEIGHT CHANGE: 0
PALPITATIONS: 0
POLYDIPSIA: 0
NUMBNESS: 0
WEAKNESS: 0
CHEST TIGHTNESS: 0
SHORTNESS OF BREATH: 0
DYSPHORIC MOOD: 0
ABDOMINAL PAIN: 0
LIGHT-HEADEDNESS: 0
HEADACHES: 0
FREQUENCY: 0
CHILLS: 0
VOMITING: 0

## 2018-12-06 NOTE — PROGRESS NOTES
HPI     Ami is a 68 year old individual that uses pronouns She/Her/Hers/Herself that presents today for follow up of:  feminizing hormone therapy.   Gender identity: female.   Started Hormone  therapy  11/2013*  Continues on .   Vivelle dot 0.1 mg patch 2x/wk    Spironlactone 100* mg daily      .  Any special concerns today?    Feeling very good, no other concerns.  Doing screening colonscopy soon    On hormones?  YES +++ Shot day of the week? Not applicable-taking pills/patch/gel      Due for labs?  No      +++ Refills of meds needed?  Yes  Gender related body changes since last visit: Stable, no changes  Gained weight in hips, pelvis  Breakthrough bleeding? 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 Never go more than 1 hour driving before rest stop  New health concerns since last visit:  As above   BP at home 120's/80's, highest 139 after long day of work.  ---    No past surgical history on file.    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     HTN (hypertension)     Hypercholesterolemia     Hip fracture, left (H)     Rheumatic fever       Current Outpatient Prescriptions   Medication Sig Dispense Refill     estradiol (MINIVELLE) 0.1 MG/24HR BIW patch Place 1 patch onto the skin twice a week 24 patch 1     simvastatin (ZOCOR) 40 MG tablet Take 1 tablet (40 mg) by mouth At Bedtime (Patient taking differently: Take 20 mg by mouth At Bedtime ) 30 tablet 1     spironolactone (ALDACTONE) 100 MG tablet Take 1 tablet (100 mg) by mouth daily 90 tablet 0       History   Smoking Status     Former Smoker     Packs/day: 0.30   Smokeless Tobacco     Never Used          Allergies   Allergen Reactions     Penicillins      Other reaction(s): Other, see comments  fainting       Health Maintenance Due   Topic Date Due     Mental Health Tx Plan Q11 MOS  06/27/2015         Problem, Medication and  "Allergy Lists were reviewed and are current..         Review of Systems:   Review of Systems   Constitutional: Negative for chills, fever and unexpected weight change.   Eyes: Negative for visual disturbance.   Respiratory: Negative for chest tightness and shortness of breath.    Cardiovascular: Negative for chest pain, palpitations and leg swelling.   Gastrointestinal: Negative for abdominal pain and vomiting.   Endocrine: Negative for polydipsia and polyuria.   Genitourinary: Negative for frequency.   Neurological: Negative for weakness, light-headedness, numbness and headaches.   Psychiatric/Behavioral: Negative for dysphoric mood and suicidal ideas. The patient is not nervous/anxious.             Physical Exam:     Vitals:    12/06/18 1322   BP: 125/83   BP Location: Left arm   Pulse: 68   Temp: 97.1  F (36.2  C)   TempSrc: Oral   Weight: 83.9 kg (185 lb)   Height: 1.715 m (5' 7.52\")     BMI= Body mass index is 28.53 kg/(m^2).   Wt Readings from Last 10 Encounters:   12/06/18 83.9 kg (185 lb)   05/10/18 84.8 kg (187 lb)   08/31/17 86.2 kg (190 lb)   02/20/17 83.2 kg (183 lb 6.4 oz)   05/26/16 82.1 kg (181 lb)   12/31/15 82.1 kg (181 lb)   05/27/15 79.8 kg (176 lb)   01/22/15 78 kg (172 lb)   08/21/14 76.7 kg (169 lb)   05/29/14 77.1 kg (170 lb)     Appearance: Female appearance and dress    GENERAL:: healthy, alert and no distress  RESP: lungs clear to auscultation - no rales, no rhonchi, no wheezes  CV: regular rates and rhythm, normal S1 S2, no S3 or S4 and no murmur, no click or rub -      Affect: Appropriate/mood-congruent           Labs:   Results from last visit:  Orders Only on 11/20/2018   Component Date Value Ref Range Status     Testosterone Total 11/20/2018 38* 240 - 950 ng/dL Final    Comment: This test was developed and its performance characteristics determined by the   [facility],  Special Chemistry Laboratory. It has   not been cleared or approved by the FDA. The " laboratory is regulated under   CLIA as qualified to perform high-complexity testing. This test is used for   clinical purposes. It should not be regarded as investigational or for   research.       Sex Hormone Binding Globulin 11/20/2018 79  11 - 80 nmol/L Final     Free Testosterone Calculated 11/20/2018 0.36* 4.7 - 24.4 ng/dL Final     FASTING SPECIMEN 11/20/2018 yes   Final     Cholesterol 11/20/2018 183.0  0.0 - 200.0 Final     HDL Cholesterol 11/20/2018 45.0  >40.0 Final     Triglycerides 11/20/2018 198.0* 0.0 - 150.0 Final     Cholesterol/HDL Ratio 11/20/2018 4.1  0.0 - 5.0 Final     LDL Cholesterol Direct 11/20/2018 99.0  0.0 - 129.0 Final     VLDL-Cholesterol 11/20/2018 40.0* 7.0 - 32.0 Final     Sodium 11/20/2018 134  133 - 144 mmol/L Final     Potassium 11/20/2018 4.5  3.4 - 5.3 mmol/L Final     Chloride 11/20/2018 102  94 - 109 mmol/L Final     Carbon Dioxide 11/20/2018 24  20 - 32 mmol/L Final     Anion Gap 11/20/2018 7  3 - 14 mmol/L Final     Glucose 11/20/2018 88  70 - 99 mg/dL Final     Urea Nitrogen 11/20/2018 20  7 - 30 mg/dL Final     Creatinine 11/20/2018 1.24  0.66 - 1.25 mg/dL Final     GFR Estimate 11/20/2018 58* >60 mL/min/1.7m2 Final    Non  GFR Calc     GFR Estimate If Black 11/20/2018 70  >60 mL/min/1.7m2 Final    African American GFR Calc     Calcium 11/20/2018 8.9  8.5 - 10.1 mg/dL Final     Bilirubin Total 11/20/2018 0.6  0.2 - 1.3 mg/dL Final     Albumin 11/20/2018 4.0  3.4 - 5.0 g/dL Final     Protein Total 11/20/2018 7.6  6.8 - 8.8 g/dL Final     Alkaline Phosphatase 11/20/2018 107  40 - 150 U/L Final     ALT 11/20/2018 22  0 - 70 U/L Final     AST 11/20/2018 19  0 - 45 U/L Final       Assessment and Plan   1. Gender dysphoria  2. HTN  Stable feminization on current dose hormones  Recommend DEXA scan, given age and hormone history  Fainted with shot of PCN but afraid needles, no PCN allergy      Follow up:  Follow up in 6 months.  Results by mychart  Questions were  elicited and answered.     Anish Xiao MD

## 2019-07-24 DIAGNOSIS — F64.0 GENDER IDENTITY DISORDER IN ADOLESCENTS OR ADULTS: ICD-10-CM

## 2019-07-24 RX ORDER — ESTRADIOL 0.1 MG/D
1 FILM, EXTENDED RELEASE TRANSDERMAL
Qty: 24 PATCH | Refills: 0 | Status: SHIPPED | OUTPATIENT
Start: 2019-07-25 | End: 2019-10-03

## 2019-07-24 RX ORDER — SPIRONOLACTONE 100 MG/1
100 TABLET, FILM COATED ORAL DAILY
Qty: 90 TABLET | Refills: 1 | Status: SHIPPED | OUTPATIENT
Start: 2019-07-24 | End: 2019-10-03

## 2019-07-24 NOTE — TELEPHONE ENCOUNTER
Requested Medication: Spironolactone  Dose: 100 mg  Quantity: 90  Refills:     Take 1 tablet daily    ___________    Requested Medication: Estradiol   Dose: 0.1 mg  Quantity: 24  Refills:     Apply 1 patch twice weekly  Refilled on 6/14 - May run out prior to appointment      Last seen at [facility]: 12/18 - 6 mo follow up  Next Appointment with Provider: 9/9        Shagufta Vicente CMA

## 2019-08-05 ENCOUNTER — TELEPHONE (OUTPATIENT)
Dept: OTHER | Facility: OUTPATIENT CENTER | Age: 69
End: 2019-08-05

## 2019-08-05 NOTE — TELEPHONE ENCOUNTER
Approved:  Estradiol Patch    5/4/2019 - 8/01/2020    ID# [id number]    [facility]      Pharmacy advised

## 2019-09-09 ENCOUNTER — TELEPHONE (OUTPATIENT)
Dept: OTHER | Facility: OUTPATIENT CENTER | Age: 69
End: 2019-09-09

## 2019-09-09 DIAGNOSIS — F64.0 GENDER DYSPHORIA IN ADOLESCENT AND ADULT: ICD-10-CM

## 2019-09-09 DIAGNOSIS — Z13.820 SCREENING FOR OSTEOPOROSIS: Primary | ICD-10-CM

## 2019-09-09 NOTE — TELEPHONE ENCOUNTER
After rescheduling 9/9 appt, pt wants to let Dr. Xiao know that their rescheduled appt is in October and that it will be about a year since their last blood work. Pt is wanting to ask if they should do labs before they come in.

## 2019-09-12 ENCOUNTER — DOCUMENTATION ONLY (OUTPATIENT)
Dept: OTHER | Facility: OUTPATIENT CENTER | Age: 69
End: 2019-09-12

## 2019-09-27 DIAGNOSIS — F64.0 GENDER IDENTITY DISORDER IN ADOLESCENTS OR ADULTS: ICD-10-CM

## 2019-09-27 DIAGNOSIS — Z01.89 ENCOUNTER FOR LABORATORY TEST: Primary | ICD-10-CM

## 2019-09-27 LAB
ALBUMIN SERPL-MCNC: 3.7 G/DL (ref 3.4–5)
ALP SERPL-CCNC: 81 U/L (ref 40–150)
ALT SERPL W P-5'-P-CCNC: 19 U/L (ref 0–70)
ANION GAP SERPL CALCULATED.3IONS-SCNC: 9 MMOL/L (ref 3–14)
AST SERPL W P-5'-P-CCNC: 17 U/L (ref 0–45)
BILIRUB SERPL-MCNC: 0.4 MG/DL (ref 0.2–1.3)
BUN SERPL-MCNC: 15 MG/DL (ref 7–30)
CALCIUM SERPL-MCNC: 8.4 MG/DL (ref 8.5–10.1)
CHLORIDE SERPL-SCNC: 104 MMOL/L (ref 94–109)
CHOLEST SERPL-MCNC: 282 MG/DL
CO2 SERPL-SCNC: 23 MMOL/L (ref 20–32)
CREAT SERPL-MCNC: 1.09 MG/DL (ref 0.66–1.25)
GFR SERPL CREATININE-BSD FRML MDRD: 69 ML/MIN/{1.73_M2}
GLUCOSE SERPL-MCNC: 82 MG/DL (ref 70–99)
HDLC SERPL-MCNC: 33 MG/DL
LDLC SERPL CALC-MCNC: 193 MG/DL
NONHDLC SERPL-MCNC: 250 MG/DL
POTASSIUM SERPL-SCNC: 4.6 MMOL/L (ref 3.4–5.3)
PROT SERPL-MCNC: 7.2 G/DL (ref 6.8–8.8)
SODIUM SERPL-SCNC: 136 MMOL/L (ref 133–144)
TRIGL SERPL-MCNC: 281 MG/DL

## 2019-09-27 PROCEDURE — 80061 LIPID PANEL: CPT | Performed by: FAMILY MEDICINE

## 2019-09-27 PROCEDURE — 80053 COMPREHEN METABOLIC PANEL: CPT | Performed by: FAMILY MEDICINE

## 2019-09-27 PROCEDURE — 84270 ASSAY OF SEX HORMONE GLOBUL: CPT | Performed by: FAMILY MEDICINE

## 2019-09-27 PROCEDURE — 84403 ASSAY OF TOTAL TESTOSTERONE: CPT | Performed by: FAMILY MEDICINE

## 2019-10-01 LAB
SHBG SERPL-SCNC: 68 NMOL/L (ref 11–80)
TESTOST FREE SERPL-MCNC: 0.63 NG/DL (ref 4.7–24.4)
TESTOST SERPL-MCNC: 58 NG/DL (ref 240–950)

## 2019-10-03 ENCOUNTER — OFFICE VISIT (OUTPATIENT)
Dept: OTHER | Facility: OUTPATIENT CENTER | Age: 69
End: 2019-10-03
Payer: COMMERCIAL

## 2019-10-03 VITALS
SYSTOLIC BLOOD PRESSURE: 132 MMHG | HEART RATE: 61 BPM | BODY MASS INDEX: 29.52 KG/M2 | DIASTOLIC BLOOD PRESSURE: 78 MMHG | WEIGHT: 191.4 LBS

## 2019-10-03 DIAGNOSIS — F64.0 GENDER IDENTITY DISORDER IN ADOLESCENTS OR ADULTS: ICD-10-CM

## 2019-10-03 RX ORDER — ESTRADIOL 0.1 MG/D
1 FILM, EXTENDED RELEASE TRANSDERMAL
Qty: 24 PATCH | Refills: 1 | Status: SHIPPED | OUTPATIENT
Start: 2019-10-03 | End: 2020-10-01

## 2019-10-03 RX ORDER — SPIRONOLACTONE 100 MG/1
100 TABLET, FILM COATED ORAL DAILY
Qty: 90 TABLET | Refills: 1 | Status: SHIPPED | OUTPATIENT
Start: 2019-10-03 | End: 2020-10-01

## 2019-10-03 ASSESSMENT — ENCOUNTER SYMPTOMS
VOMITING: 0
NERVOUS/ANXIOUS: 0
CHEST TIGHTNESS: 0
FREQUENCY: 0
PALPITATIONS: 0
POLYDIPSIA: 0
FEVER: 0
HEADACHES: 0
ABDOMINAL PAIN: 0
SHORTNESS OF BREATH: 0
DYSPHORIC MOOD: 0
LIGHT-HEADEDNESS: 0
NUMBNESS: 0
UNEXPECTED WEIGHT CHANGE: 0
CHILLS: 0
WEAKNESS: 0

## 2019-10-03 NOTE — PROGRESS NOTES
LUIS Mueller is a 69 year old individual that uses pronouns She/Her/Hers/Herself that presents today for follow up of:  feminizing hormone therapy.   Alone or accompanied by: accompanied today by se;f  Gender identity: female  Started Hormone  therapy  2013  Continues on  Vivelle dot 0.1 mg patch 2x/wk                          Spironlactone 100* mg daily     Any special concerns today?   Doing well, no new concerns  Has not done DEXA yet, will schedule  On hormones?  YES +++ Shot day of the week? Not applicable-taking pills/patch/gel      Due for labs?  No      +++ Refills of meds needed?  Yes  Gender related body changes since last visit:   None--has not gotten as many migraines    Breakthrough bleeding? Does Not Apply  Activity: walk, general activity  New health concerns since last visit:  none  ---    No past surgical history on file.    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     HTN (hypertension)     Hypercholesterolemia     Hip fracture, left (H)     Rheumatic fever       Current Outpatient Medications   Medication Sig Dispense Refill     estradiol (MINIVELLE) 0.1 MG/24HR bi-weekly patch Place 1 patch onto the skin twice a week 24 patch 0     simvastatin (ZOCOR) 40 MG tablet Take 1 tablet (40 mg) by mouth At Bedtime (Patient taking differently: Take 20 mg by mouth At Bedtime ) 30 tablet 1     spironolactone (ALDACTONE) 100 MG tablet Take 1 tablet (100 mg) by mouth daily 90 tablet 1       History   Smoking Status     Former Smoker     Packs/day: 0.30   Smokeless Tobacco     Never Used        No Active Allergies    Health Maintenance Due   Topic Date Due     MENTAL HEALTH TX PLAN  1950         Problem, Medication and Allergy Lists were reviewed and are current..         Review of Systems:   Review of Systems   Constitutional: Negative for chills, fever and unexpected weight change.   Eyes: Negative for visual disturbance.    Respiratory: Negative for chest tightness and shortness of breath.    Cardiovascular: Positive for leg swelling. Negative for chest pain and palpitations.        Ankle swelling when drive a long way   Gastrointestinal: Negative for abdominal pain and vomiting.   Endocrine: Negative for polydipsia and polyuria.   Genitourinary: Negative for frequency.   Neurological: Negative for weakness, light-headedness, numbness and headaches.   Psychiatric/Behavioral: Negative for dysphoric mood and suicidal ideas. The patient is not nervous/anxious.             Physical Exam:   Blood pressure 132/78, pulse 61, weight 86.8 kg (191 lb 6.4 oz).    Body mass index is 29.52 kg/m .    BMI= There is no height or weight on file to calculate BMI.   Wt Readings from Last 10 Encounters:   12/06/18 83.9 kg (185 lb)   05/10/18 84.8 kg (187 lb)   08/31/17 86.2 kg (190 lb)   02/20/17 83.2 kg (183 lb 6.4 oz)   05/26/16 82.1 kg (181 lb)   12/31/15 82.1 kg (181 lb)   05/27/15 79.8 kg (176 lb)   01/22/15 78 kg (172 lb)   08/21/14 76.7 kg (169 lb)   05/29/14 77.1 kg (170 lb)     Appearance: Female appearance and dress    GENERAL:: healthy, alert and no distress  RESP: lungs clear to auscultation - no rales, no rhonchi, no wheezes  CV: regular rates and rhythm, normal S1 S2, no S3 or S4 and no murmur, no click or rub -  No edema      Affect: Appropriate/mood-congruent           Labs:   Results from last visit:  Orders Only on 09/27/2019   Component Date Value Ref Range Status     Sodium 09/27/2019 136  133 - 144 mmol/L Final     Potassium 09/27/2019 4.6  3.4 - 5.3 mmol/L Final     Chloride 09/27/2019 104  94 - 109 mmol/L Final     Carbon Dioxide 09/27/2019 23  20 - 32 mmol/L Final     Anion Gap 09/27/2019 9  3 - 14 mmol/L Final     Glucose 09/27/2019 82  70 - 99 mg/dL Final     Urea Nitrogen 09/27/2019 15  7 - 30 mg/dL Final     Creatinine 09/27/2019 1.09  0.66 - 1.25 mg/dL Final     GFR Estimate 09/27/2019 69  >60 mL/min/[1.73_m2] Final     Comment: Non  GFR Calc  Starting 12/18/2018, serum creatinine based estimated GFR (eGFR) will be   calculated using the Chronic Kidney Disease Epidemiology Collaboration   (CKD-EPI) equation.       GFR Estimate If Black 09/27/2019 80  >60 mL/min/[1.73_m2] Final    Comment:  GFR Calc  Starting 12/18/2018, serum creatinine based estimated GFR (eGFR) will be   calculated using the Chronic Kidney Disease Epidemiology Collaboration   (CKD-EPI) equation.       Calcium 09/27/2019 8.4* 8.5 - 10.1 mg/dL Final     Bilirubin Total 09/27/2019 0.4  0.2 - 1.3 mg/dL Final     Albumin 09/27/2019 3.7  3.4 - 5.0 g/dL Final     Protein Total 09/27/2019 7.2  6.8 - 8.8 g/dL Final     Alkaline Phosphatase 09/27/2019 81  40 - 150 U/L Final     ALT 09/27/2019 19  0 - 70 U/L Final     AST 09/27/2019 17  0 - 45 U/L Final     Testosterone Total 09/27/2019 58* 240 - 950 ng/dL Final    Comment: This test was developed and its performance characteristics determined by the   [facility],  Special Chemistry Laboratory. It has   not been cleared or approved by the FDA. The laboratory is regulated under   CLIA as qualified to perform high-complexity testing. This test is used for   clinical purposes. It should not be regarded as investigational or for   research.       Sex Hormone Binding Globulin 09/27/2019 68  11 - 80 nmol/L Final     Free Testosterone Calculated 09/27/2019 0.63* 4.7 - 24.4 ng/dL Final     Cholesterol 09/27/2019 282* <200 mg/dL Final    Desirable:       <200 mg/dl     Triglycerides 09/27/2019 281* <150 mg/dL Final    Comment: Borderline high:  150-199 mg/dl  High:             200-499 mg/dl  Very high:       >499 mg/dl       HDL Cholesterol 09/27/2019 33* >39 mg/dL Final     LDL Cholesterol Calculated 09/27/2019 193* <100 mg/dL Final    Comment: Above desirable:  100-129 mg/dl  Borderline High:  130-159 mg/dL  High:             160-189 mg/dL  Very high:       >189 mg/dl        Non HDL Cholesterol 09/27/2019 250* <130 mg/dL Final    Comment: Above Desirable:  130-159 mg/dl  Borderline high:  160-189 mg/dl  High:             190-219 mg/dl  Very high:       >219 mg/dl           Assessment and Plan   1. Gender dysphoria  2. Hyper cholesterol  Clincally stable response to hormone therapy  Cholesterol levels reviewed with patient--she has been  Off statin, ran out  need to PCP refill  Weight up a little,   Reviewed other health maintenance: Needs DEXA, had mammogram last year    Restart Zocor with her PCP  No hormone changes  discontinue high cholesterol snacks  Do DEXA scan  Mammogram every 1-2 years      Follow up:  Follow up in 6 months.  Results by mychart  Questions were elicited and answered.     Anish Xiao MD

## 2020-09-29 VITALS — BODY MASS INDEX: 28.53 KG/M2 | WEIGHT: 185 LBS

## 2020-09-29 NOTE — PROGRESS NOTES
Chart reviewed with patient and updated.    Will drive to [place] for visit      Shagufta Vicente CMA

## 2020-10-01 ENCOUNTER — VIRTUAL VISIT (OUTPATIENT)
Dept: FAMILY MEDICINE | Facility: CLINIC | Age: 70
End: 2020-10-01
Payer: COMMERCIAL

## 2020-10-01 DIAGNOSIS — F64.0 GENDER IDENTITY DISORDER IN ADOLESCENTS OR ADULTS: Primary | ICD-10-CM

## 2020-10-01 PROCEDURE — 99213 OFFICE O/P EST LOW 20 MIN: CPT | Mod: 95 | Performed by: FAMILY MEDICINE

## 2020-10-01 RX ORDER — SPIRONOLACTONE 100 MG/1
100 TABLET, FILM COATED ORAL DAILY
Qty: 90 TABLET | Refills: 1 | Status: SHIPPED | OUTPATIENT
Start: 2020-10-01 | End: 2021-04-01

## 2020-10-01 RX ORDER — ESTRADIOL 0.1 MG/D
1 FILM, EXTENDED RELEASE TRANSDERMAL
Qty: 24 PATCH | Refills: 1 | Status: SHIPPED | OUTPATIENT
Start: 2020-10-01 | End: 2021-04-22

## 2020-10-01 ASSESSMENT — ENCOUNTER SYMPTOMS
SHORTNESS OF BREATH: 0
FREQUENCY: 0
UNEXPECTED WEIGHT CHANGE: 0
NUMBNESS: 0
LIGHT-HEADEDNESS: 0
FEVER: 0
VOMITING: 0
NERVOUS/ANXIOUS: 0
CHEST TIGHTNESS: 0
ABDOMINAL PAIN: 0
WEAKNESS: 0
POLYDIPSIA: 0
DYSPHORIC MOOD: 0
CHILLS: 0
HEADACHES: 0
PALPITATIONS: 0

## 2020-10-01 NOTE — LETTER
April 8, 2021      Jarrod Akua  [address] [place] [id number]        Dear Ami,    The results of your recent test(s) listed below were normal.     Results for orders placed or performed in visit on 10/01/20   Basic metabolic panel     Status: None   Result Value Ref Range    Sodium 139 mmol/L    Potassium 4.7 mmol/L    Chloride 105 mmol/L    Carbon Dioxide 23 mmol/L    Anion Gap 11 mmol/L    Glucose 88 70 - 99 mg/dL    Urea Nitrogen 20 mg/dL    Creatinine 1.10 mg/dL    Calcium 8.9 mg/dL    GFR Estimate      GFR Estimate If Black     Lipid Profile     Status: None   Result Value Ref Range    Cholesterol 163 mg/dL    Triglycerides 393 <=149 mg/dL    HDL Cholesterol 31 >=40 mg/dL    LDL Cholesterol Calculated 53 mg/dL    Non HDL Cholesterol 132 mg/dL    Cholesterol/HDL Ratio 5.3          Please note that test explanations are brief and do not reflect all diagnostic uses.  If you have any questions or concerns, please call the clinic at [phone].      Sincerely,    Anish Xiao MD

## 2020-10-01 NOTE — PROGRESS NOTES
"The following was reviewed with the patient.     \"This telephone visit will be conducted via a call between you and your physician/provider. We have found that certain health care needs can be provided without the need for a physical exam.  This service lets us provide the care you need with a short phone conversation.  If a prescription is necessary we can send it directly to your pharmacy.  If lab work is needed we can place an order for that and you can then stop by our lab to have the test done at a later time.    If during the course of the call the physician/provider feels a telephone visit is not appropriate, you will not be charged for this service.\"     Patient has given verbal consent for Telephone visit?  Yes         LUIS Mueller is a 70 year old individual that uses pronouns She/Her/Hers/Herself that presents today for follow up of:  feminizing hormone therapy.   Alone or accompanied by: accompanied today by self  Gender identity: female  Started Hormone  therapy  2013  Continues on Vivelle dot 0.1 mg patch 2x/wk and Spironlactone 100* mg daily   Any special concerns today?   Seeing PCP physicians, who refilled prescription until now  Monitoring BP, yesterday was 120/86    On hormones?  YES +++ Shot day of the week? Not applicable-taking pills/patch/gel      Due for labs?  Yes      +++ Refills of meds needed?  Yes  Gender related body changes since last visit:   Stable since last visit    Breakthrough bleeding? Does Not Apply    New health concerns since last visit:  ---Restarted statin with PCP  No other issues    No past surgical history on file.    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     HTN (hypertension)     Hypercholesterolemia     Hip fracture, left (H)     Rheumatic fever       Current Outpatient Medications   Medication Sig Dispense Refill     estradiol (MINIVELLE) 0.1 MG/24HR bi-weekly patch Place 1 patch onto the skin twice " a week 24 patch 1     simvastatin (ZOCOR) 40 MG tablet Take 1 tablet (40 mg) by mouth At Bedtime (Patient taking differently: Take 20 mg by mouth At Bedtime ) 30 tablet 1     spironolactone (ALDACTONE) 100 MG tablet Take 1 tablet (100 mg) by mouth daily 90 tablet 1       History   Smoking Status     Former Smoker     Packs/day: 0.30   Smokeless Tobacco     Never Used        No Known Allergies    Health Maintenance Due   Topic Date Due     MENTAL HEALTH TX PLAN  1950         Problem, Medication and Allergy Lists were reviewed and are current..         Review of Systems:   Review of Systems   Constitutional: Negative for chills, fever and unexpected weight change.   Eyes: Negative for visual disturbance.   Respiratory: Negative for chest tightness and shortness of breath.    Cardiovascular: Negative for chest pain, palpitations and leg swelling.   Gastrointestinal: Negative for abdominal pain and vomiting.   Endocrine: Negative for polydipsia and polyuria.   Genitourinary: Negative for frequency.   Neurological: Negative for weakness, light-headedness, numbness and headaches.   Psychiatric/Behavioral: Negative for dysphoric mood and suicidal ideas. The patient is not nervous/anxious.               Labs:   Results from last visit:  Orders Only on 09/27/2019   Component Date Value Ref Range Status     Sodium 09/27/2019 136  133 - 144 mmol/L Final     Potassium 09/27/2019 4.6  3.4 - 5.3 mmol/L Final     Chloride 09/27/2019 104  94 - 109 mmol/L Final     Carbon Dioxide 09/27/2019 23  20 - 32 mmol/L Final     Anion Gap 09/27/2019 9  3 - 14 mmol/L Final     Glucose 09/27/2019 82  70 - 99 mg/dL Final     Urea Nitrogen 09/27/2019 15  7 - 30 mg/dL Final     Creatinine 09/27/2019 1.09  0.66 - 1.25 mg/dL Final     GFR Estimate 09/27/2019 69  >60 mL/min/[1.73_m2] Final    Comment: Non  GFR Calc  Starting 12/18/2018, serum creatinine based estimated GFR (eGFR) will be   calculated using the Chronic Kidney  Disease Epidemiology Collaboration   (CKD-EPI) equation.       GFR Estimate If Black 09/27/2019 80  >60 mL/min/[1.73_m2] Final    Comment:  GFR Calc  Starting 12/18/2018, serum creatinine based estimated GFR (eGFR) will be   calculated using the Chronic Kidney Disease Epidemiology Collaboration   (CKD-EPI) equation.       Calcium 09/27/2019 8.4* 8.5 - 10.1 mg/dL Final     Bilirubin Total 09/27/2019 0.4  0.2 - 1.3 mg/dL Final     Albumin 09/27/2019 3.7  3.4 - 5.0 g/dL Final     Protein Total 09/27/2019 7.2  6.8 - 8.8 g/dL Final     Alkaline Phosphatase 09/27/2019 81  40 - 150 U/L Final     ALT 09/27/2019 19  0 - 70 U/L Final     AST 09/27/2019 17  0 - 45 U/L Final     Testosterone Total 09/27/2019 58* 240 - 950 ng/dL Final    Comment: This test was developed and its performance characteristics determined by the   [facility],  Special Chemistry Laboratory. It has   not been cleared or approved by the FDA. The laboratory is regulated under   CLIA as qualified to perform high-complexity testing. This test is used for   clinical purposes. It should not be regarded as investigational or for   research.       Sex Hormone Binding Globulin 09/27/2019 68  11 - 80 nmol/L Final     Free Testosterone Calculated 09/27/2019 0.63* 4.7 - 24.4 ng/dL Final     Cholesterol 09/27/2019 282* <200 mg/dL Final    Desirable:       <200 mg/dl     Triglycerides 09/27/2019 281* <150 mg/dL Final    Comment: Borderline high:  150-199 mg/dl  High:             200-499 mg/dl  Very high:       >499 mg/dl       HDL Cholesterol 09/27/2019 33* >39 mg/dL Final     LDL Cholesterol Calculated 09/27/2019 193* <100 mg/dL Final    Comment: Above desirable:  100-129 mg/dl  Borderline High:  130-159 mg/dL  High:             160-189 mg/dL  Very high:       >189 mg/dl       Non HDL Cholesterol 09/27/2019 250* <130 mg/dL Final    Comment: Above Desirable:  130-159 mg/dl  Borderline high:  160-189 mg/dl  High:              "190-219 mg/dl  Very high:       >219 mg/dl       Exam on 2/25/2020 from PCP, copied forward:  OBJECTIVE  /73 (BP Location: Right Arm, BP Cuff Size: Regular - Long)  Pulse 67  Temp 98.6  F (37  C) (Tympanic)  Resp 18  Ht 5' 7\" (1.702 m)  Wt 194 lb (88 kg)  BMI 30.38 kg/m    General: Alert and oriented.   HEENT: Normocephalic. PERRLA. EOMI. TMs are clear. Oral mucosa is moist. No sinus tenderness.  Neck: Supple. Nontender. No lymphadenopathy. No thyromegaly.  Respiratory: Lungs are clear to auscultation. Breath sounds are equal.  Cardiovascular: Normal rate. Regular rhythm. No murmur.  Gastrointestinal: Soft and nontender.  Musculoskeletal: Normal range of motion. Normal strength.   Integumentary: Warm and dry.  Neurologic: Alert and oriented.   Psychiatric: Cooperative. Normal judgement.      Assessment and Plan   1. Gender dysphoria  Stable response to current hormone regimen  Labs: lipids, BMP, testosterone, estradiol    Recommend to check with PCP on ordering DEXA, and fax over any results  Follow-up in  6 months     Phone call duration: 13 minutes        Results by mychart  Questions were elicited and answered.     Anish Xiao MD    "

## 2021-03-19 ENCOUNTER — TRANSFERRED RECORDS (OUTPATIENT)
Dept: HEALTH INFORMATION MANAGEMENT | Facility: CLINIC | Age: 71
End: 2021-03-19

## 2021-03-19 LAB
ANION GAP SERPL CALCULATED.3IONS-SCNC: 11 MMOL/L
BUN SERPL-MCNC: 20 MG/DL
CALCIUM SERPL-MCNC: 8.9 MG/DL
CHLORIDE SERPLBLD-SCNC: 105 MMOL/L
CHOLEST SERPL-MCNC: 163 MG/DL
CHOLEST/HDLC SERPL: 5.3 {RATIO}
CO2 SERPL-SCNC: 23 MMOL/L
CREAT SERPL-MCNC: 1.1 MG/DL
GFR SERPL CREATININE-BSD FRML MDRD: NORMAL ML/MIN/{1.73_M2}
GLUCOSE SERPL-MCNC: 88 MG/DL (ref 70–99)
HDLC SERPL-MCNC: 31 MG/DL
LDLC SERPL CALC-MCNC: 53 MG/DL
NONHDLC SERPL-MCNC: 132 MG/DL
POTASSIUM SERPL-SCNC: 4.7 MMOL/L
SODIUM SERPL-SCNC: 139 MMOL/L
TRIGL SERPL-MCNC: 393 MG/DL

## 2021-04-01 DIAGNOSIS — F64.0 GENDER IDENTITY DISORDER IN ADOLESCENTS OR ADULTS: ICD-10-CM

## 2021-04-01 RX ORDER — SPIRONOLACTONE 100 MG/1
100 TABLET, FILM COATED ORAL DAILY
Qty: 90 TABLET | Refills: 1 | Status: SHIPPED | OUTPATIENT
Start: 2021-04-01 | End: 2021-10-28

## 2021-04-01 NOTE — TELEPHONE ENCOUNTER
Requested Medication: Spironolactone   Dose: 100mg  Quantity: 90  Refills: 1    Take 1 tablet daily    Last seen at [facility]: 10/1/2020 - 6 mo follow up  Next Appointment with Provider: 4/22/2021 r/s from  4/13    Labs and dexa results being sent      Shagufta Vicente CMA

## 2021-04-22 ENCOUNTER — VIRTUAL VISIT (OUTPATIENT)
Dept: FAMILY MEDICINE | Facility: CLINIC | Age: 71
End: 2021-04-22
Payer: COMMERCIAL

## 2021-04-22 DIAGNOSIS — M85.9 LOW BONE DENSITY: Primary | ICD-10-CM

## 2021-04-22 DIAGNOSIS — E78.1 HYPERTRIGLYCERIDEMIA: ICD-10-CM

## 2021-04-22 DIAGNOSIS — F64.0 GENDER IDENTITY DISORDER IN ADOLESCENTS OR ADULTS: ICD-10-CM

## 2021-04-22 PROCEDURE — 99214 OFFICE O/P EST MOD 30 MIN: CPT | Mod: TEL | Performed by: FAMILY MEDICINE

## 2021-04-22 RX ORDER — ESTRADIOL 0.1 MG/D
1 FILM, EXTENDED RELEASE TRANSDERMAL
Qty: 24 PATCH | Refills: 1 | Status: SHIPPED | OUTPATIENT
Start: 2021-04-22 | End: 2021-10-28

## 2021-04-22 ASSESSMENT — ENCOUNTER SYMPTOMS
FEVER: 0
SHORTNESS OF BREATH: 0
CHILLS: 0
UNEXPECTED WEIGHT CHANGE: 0

## 2021-04-22 NOTE — PROGRESS NOTES
Patient has given verbal consent for Telephone visit?  Yes         LIUS Mueller is a 70 year old individual that uses pronouns She/Her/Hers/Herself that presents today for follow up of:  feminizing hormone therapy.   Alone or accompanied by: accompanied today by      Gender identity: female  Started Hormone  therapy  2013  Continues on Vivelle dot 0.1 mg patch 2x/wk and Spironlactone 100* mg daily   Any special concerns today?    No new concerns, hormone therapy going well    On hormones?  YES +++ Shot day of the week? Not applicable-taking pills/patch/gel      Due for labs?  No      +++ Refills of meds needed?  Yes  Gender related body changes since last visit: stable    Breakthrough bleeding? Does Not Apply    New health concerns since last visit:  ---none    No past surgical history on file.    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     HTN (hypertension)     Hypercholesterolemia     Hip fracture, left (H)     Rheumatic fever       Current Outpatient Medications   Medication Sig Dispense Refill     estradiol (MINIVELLE) 0.1 MG/24HR bi-weekly patch Place 1 patch onto the skin twice a week 24 patch 1     simvastatin (ZOCOR) 40 MG tablet Take 1 tablet (40 mg) by mouth At Bedtime (Patient taking differently: Take 20 mg by mouth At Bedtime ) 30 tablet 1     spironolactone (ALDACTONE) 100 MG tablet Take 1 tablet (100 mg) by mouth daily 90 tablet 1       History   Smoking Status     Former Smoker     Packs/day: 0.30   Smokeless Tobacco     Never Used        No Known Allergies    Health Maintenance Due   Topic Date Due     MENTAL HEALTH TX PLAN  Never done         Problem, Medication and Allergy Lists were reviewed and are current..         Review of Systems:   Review of Systems   Constitutional: Negative for chills, fever and unexpected weight change.   Respiratory: Negative for shortness of breath.    Cardiovascular: Negative for chest pain and leg  swelling.         Patient had annual exam on 3/16/2021, reviewed document, exam copied forward  Vital Signs: /69 (BP Location: Right Arm, BP Cuff Size: Regular - Long)  Pulse 65  Temp 97  F (36.1  C) (Tympanic)  Resp 20  Wt 200 lb (90.7 kg)  BMI 31.32 kg/m    EYES: Normal  HEAD, EARS, NOSE, MOUTH, AND THROAT: Normal  NECK: Normal  CHEST/BREAST: Normal  RESPIRATORY: clear to A  CARDIOVASCULAR: Normal  ABDOMEN: Normal  GENITOURINARY: maleto female,transgender  SKIN/HAIR/NAILS: Normal, no decubiti  NEUROLOGIC: Normal   PSYCHIATRIC: Normal           Labs:   Results from last visit:  Virtual Visit on 10/01/2020   Component Date Value Ref Range Status     Sodium 03/10/2021 139  mmol/L Final     Potassium 03/10/2021 4.7  mmol/L Final     Chloride 03/10/2021 105  mmol/L Final     Carbon Dioxide 03/10/2021 23  mmol/L Final     Anion Gap 03/10/2021 11  mmol/L Final     Glucose 03/10/2021 88  70 - 99 mg/dL Final     Urea Nitrogen 03/10/2021 20  mg/dL Final     Creatinine 03/10/2021 1.10  mg/dL Final     Calcium 03/10/2021 8.9  mg/dL Final     Cholesterol 03/10/2021 163  mg/dL Final     Triglycerides 03/10/2021 393  <=149 mg/dL Final     HDL Cholesterol 03/10/2021 31  >=40 mg/dL Final     LDL Cholesterol Calculated 03/10/2021 53  mg/dL Final     Non HDL Cholesterol 03/10/2021 132  mg/dL Final     Cholesterol/HDL Ratio 03/10/2021 5.3   Final   Ate 10 am    DEXA Lowest T score -1.4 Left Hip      Assessment and Plan   1. Gender dysphoria  2. Low bone density  3. 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  Reviewed DEXA and low bone density ;  Discussed need for adequate vit D and calcium, weight bearing  activity, consider repeat in 2-3 years  Asked about bottom surgery and various options including minimal depth vaginoplasty  Reviewed briefly and can discuss further with gender therapist and at next visit      Follow-up 6 months  35 minutes spent on the date of the encounter doing chart review, review of test results, interpretation of tests, patient visit and documentation          Phone call duration: 30 minutes        [facility] by [facility]  Questions were elicited and answered.     Anish Xiao MD

## 2021-04-23 ENCOUNTER — TRANSFERRED RECORDS (OUTPATIENT)
Dept: HEALTH INFORMATION MANAGEMENT | Facility: CLINIC | Age: 71
End: 2021-04-23

## 2021-10-28 ENCOUNTER — VIRTUAL VISIT (OUTPATIENT)
Dept: FAMILY MEDICINE | Facility: CLINIC | Age: 71
End: 2021-10-28
Payer: COMMERCIAL

## 2021-10-28 DIAGNOSIS — E78.1 HYPERTRIGLYCERIDEMIA: Primary | ICD-10-CM

## 2021-10-28 DIAGNOSIS — F64.0 GENDER IDENTITY DISORDER IN ADOLESCENTS OR ADULTS: ICD-10-CM

## 2021-10-28 PROCEDURE — 99214 OFFICE O/P EST MOD 30 MIN: CPT | Mod: 95 | Performed by: FAMILY MEDICINE

## 2021-10-28 RX ORDER — SPIRONOLACTONE 100 MG/1
100 TABLET, FILM COATED ORAL DAILY
Qty: 90 TABLET | Refills: 1 | Status: SHIPPED | OUTPATIENT
Start: 2021-10-28 | End: 2022-04-26

## 2021-10-28 RX ORDER — ESTRADIOL 0.1 MG/D
1 FILM, EXTENDED RELEASE TRANSDERMAL
Qty: 24 PATCH | Refills: 1 | Status: SHIPPED | OUTPATIENT
Start: 2021-10-28 | End: 2022-04-26

## 2021-10-28 ASSESSMENT — ENCOUNTER SYMPTOMS
SHORTNESS OF BREATH: 0
UNEXPECTED WEIGHT CHANGE: 0
LIGHT-HEADEDNESS: 0
FEVER: 0
CHILLS: 0

## 2021-10-28 NOTE — PROGRESS NOTES
Patient has given verbal consent for Telephone visit?  Yes         LUIS Mueller is a 71 year old individual that uses pronouns She/Her/Hers/Herself that presents today for follow up of:  feminizing hormone therapy.   Alone or accompanied by: accompanied today by      Gender identity: female  Started Hormone  therapy  2013  Continues on Vivelle dot 0.1 mg patch 2x/wk and Spironlactone 100* mg daily   Any special concerns today?    Would like to move forward with bottom surgery, interested in minimal depth/vulvoplasty  Discussed need for letters of support,  send list of community therapists, as well as seeing if [facility] therapist can see  Referral to [facility], for surgery consult    No other concerns     On hormones?  YES +++ Shot day of the week? Not applicable-taking pills/patch/gel      Due for labs?  Yes      +++ Refills of meds needed?  Yes  Gender related body changes since last visit: stable    Breakthrough bleeding? Does Not Apply    New health concerns since last visit: none, walking, staying active still working  ---    No past surgical history on file.    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     HTN (hypertension)     Hypercholesterolemia     Hip fracture, left (H)     Rheumatic fever       Current Outpatient Medications   Medication Sig Dispense Refill     estradiol (MINIVELLE) 0.1 MG/24HR bi-weekly patch Place 1 patch onto the skin twice a week 24 patch 1     simvastatin (ZOCOR) 40 MG tablet Take 1 tablet (40 mg) by mouth At Bedtime (Patient taking differently: Take 20 mg by mouth At Bedtime ) 30 tablet 1     spironolactone (ALDACTONE) 100 MG tablet Take 1 tablet (100 mg) by mouth daily 90 tablet 1       History   Smoking Status     Former Smoker     Packs/day: 0.30   Smokeless Tobacco     Never Used        No Known Allergies    Health Maintenance Due   Topic Date Due     MENTAL HEALTH TX PLAN  Never done         Problem,  Medication and Allergy Lists were reviewed and are current..         Review of Systems:   Review of Systems   Constitutional: Negative for chills, fever and unexpected weight change.   Respiratory: Negative for shortness of breath.    Cardiovascular: Positive for leg swelling. Negative for chest pain.        Edema with driving   Neurological: Negative for light-headedness.              Labs:   Results from last visit:  Virtual Visit on 10/01/2020   Component Date Value Ref Range Status     Sodium 03/10/2021 139  mmol/L Final     Potassium 03/10/2021 4.7  mmol/L Final     Chloride 03/10/2021 105  mmol/L Final     Carbon Dioxide 03/10/2021 23  mmol/L Final     Anion Gap 03/10/2021 11  mmol/L Final     Glucose 03/10/2021 88  70 - 99 mg/dL Final     Urea Nitrogen 03/10/2021 20  mg/dL Final     Creatinine 03/10/2021 1.10  mg/dL Final     Calcium 03/10/2021 8.9  mg/dL Final     Cholesterol 03/10/2021 163  mg/dL Final     Triglycerides 03/10/2021 393  <=149 mg/dL Final     HDL Cholesterol 03/10/2021 31  >=40 mg/dL Final     LDL Cholesterol Calculated 03/10/2021 53  mg/dL Final     Non HDL Cholesterol 03/10/2021 132  mg/dL Final     Cholesterol/HDL Ratio 03/10/2021 5.3   Final       /77 per patient recently  Weight 185 lb per pt  Alert, NAD on phone  Assessment and Plan   1. Gender dysphoria  Response-: Stable response to current hormone regimen/dose    Labs:  Repeat lipids as high trigylcerides, currently on zocor  See above for surgery     Follow-up 6 months  Prefers in person next time.         Phone call duration: 17 minutes        Results by lalahart  Questions were elicited and answered.     Anish Xiao MD

## 2022-04-20 ENCOUNTER — TELEPHONE (OUTPATIENT)
Dept: FAMILY MEDICINE | Facility: CLINIC | Age: 72
End: 2022-04-20
Payer: COMMERCIAL

## 2022-04-20 NOTE — TELEPHONE ENCOUNTER
PT called regarding needing to book an appt with Ninoska, said she needs blood tests done, but wants to speak to Shagufta directly.

## 2022-04-21 ENCOUNTER — TELEPHONE (OUTPATIENT)
Dept: FAMILY MEDICINE | Facility: CLINIC | Age: 72
End: 2022-04-21
Payer: COMMERCIAL

## 2022-04-26 DIAGNOSIS — F64.0 GENDER IDENTITY DISORDER IN ADOLESCENTS OR ADULTS: ICD-10-CM

## 2022-04-26 RX ORDER — ESTRADIOL 0.1 MG/D
1 FILM, EXTENDED RELEASE TRANSDERMAL
Qty: 24 PATCH | Refills: 0 | Status: SHIPPED | OUTPATIENT
Start: 2022-04-28 | End: 2022-05-26

## 2022-04-26 RX ORDER — SPIRONOLACTONE 100 MG/1
100 TABLET, FILM COATED ORAL DAILY
Qty: 90 TABLET | Refills: 0 | Status: SHIPPED | OUTPATIENT
Start: 2022-04-26 | End: 2022-05-26

## 2022-04-26 NOTE — CONFIDENTIAL NOTE
Requested Medication: Estradiol 0.1mg Patch  Dose: 0.1mg  Quantity: 24  Refills: 1    Apply 1 patch twice weekly  _____    Requested Medication: Spironolactone 100mg  Dose: 100mg  Quantity: 90  Refills: 1    Take 1 tablet daily    Last seen at [facility]: 10/28/2021 - 6 mo follow up  Next Appointment with Provider: 5/26/2022    Labs to be completed prior to visit      Shagufta Vicente CMA

## 2022-05-03 LAB
CHOLESTEROL (EXTERNAL): 182 MG/DL
ESTRADIOL SERPL-MCNC: 90 PG/ML
HDLC SERPL-MCNC: 34 MG/DL
LDL CHOLESTEROL (EXTERNAL): NORMAL MG/DL
LDL CHOLESTEROL CALCULATED (EXTERNAL): 81 MG/DL
NON HDL CHOLESTEROL (EXTERNAL): 148 MG/DL
TRIGLYCERIDES (EXTERNAL): 334 MG/DL

## 2022-05-18 LAB
TESTOST SERPL-MCNC: 126 NG/DL
TESTOSTERONE FREE: 13.3

## 2022-05-26 ENCOUNTER — VIRTUAL VISIT (OUTPATIENT)
Dept: FAMILY MEDICINE | Facility: CLINIC | Age: 72
End: 2022-05-26
Payer: COMMERCIAL

## 2022-05-26 DIAGNOSIS — E78.1 HYPERTRIGLYCERIDEMIA: Primary | ICD-10-CM

## 2022-05-26 DIAGNOSIS — F64.0 GENDER IDENTITY DISORDER IN ADOLESCENTS OR ADULTS: ICD-10-CM

## 2022-05-26 PROCEDURE — 99213 OFFICE O/P EST LOW 20 MIN: CPT | Mod: 95 | Performed by: FAMILY MEDICINE

## 2022-05-26 RX ORDER — SPIRONOLACTONE 100 MG/1
100 TABLET, FILM COATED ORAL DAILY
Qty: 90 TABLET | Refills: 1 | Status: SHIPPED | OUTPATIENT
Start: 2022-05-26 | End: 2022-09-16

## 2022-05-26 RX ORDER — ESTRADIOL 0.1 MG/D
1 FILM, EXTENDED RELEASE TRANSDERMAL
Qty: 24 PATCH | Refills: 1 | Status: SHIPPED | OUTPATIENT
Start: 2022-05-26 | End: 2022-12-13

## 2022-05-26 ASSESSMENT — ENCOUNTER SYMPTOMS
CHILLS: 0
UNEXPECTED WEIGHT CHANGE: 0
FEVER: 0
LIGHT-HEADEDNESS: 0
SHORTNESS OF BREATH: 0

## 2022-05-26 NOTE — PROGRESS NOTES
Patient has given verbal consent for Telephone visit?  Yes         LUIS Mueller is a 72 year old individual that uses pronouns She/Her/Hers/Herself that presents today for follow up of:  feminizing hormone therapy.   Alone or accompanied by: accompanied today by      Gender identity: female  Started Hormone  therapy  2013  Continues on Vivelle dot 0.1 mg patch 2x/wk and Spironlactone 100* mg daily   Any special concerns today?    Doing well, walking less--has been busy  Not able to move forward with bottom surgery--family losses this winter    On hormones?  YES +++ Shot day of the week? Not applicable-taking pills/patch/gel      Due for labs?  Yes      +++ Refills of meds needed?  Yes  Gender related body changes since last visit:   Stable, hips bigger    Breakthrough bleeding? Does Not Apply    New health concerns since last visit:  ---no new health concerns    No past surgical history on file.    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     HTN (hypertension)     Hypercholesterolemia     Hip fracture, left (H)     Rheumatic fever       Current Outpatient Medications   Medication Sig Dispense Refill     estradiol (MINIVELLE) 0.1 MG/24HR bi-weekly patch Place 1 patch onto the skin twice a week 24 patch 0     simvastatin (ZOCOR) 40 MG tablet Take 1 tablet (40 mg) by mouth At Bedtime (Patient taking differently: Take 20 mg by mouth At Bedtime) 30 tablet 1     spironolactone (ALDACTONE) 100 MG tablet Take 1 tablet (100 mg) by mouth daily 90 tablet 0       History   Smoking Status     Former Smoker     Packs/day: 0.30   Smokeless Tobacco     Never Used        No Known Allergies    Health Maintenance Due   Topic Date Due     MENTAL HEALTH TX PLAN  Never done         Problem, Medication and Allergy Lists were reviewed and are current..         Review of Systems:   Review of Systems   Constitutional: Negative for chills, fever and unexpected weight change.    Respiratory: Negative for shortness of breath.    Cardiovascular: Negative for chest pain and leg swelling.   Neurological: Negative for light-headedness.              Labs:   Results from last visit:  Virtual Visit on 10/01/2020   Component Date Value Ref Range Status     Sodium 03/10/2021 139  mmol/L Final     Potassium 03/10/2021 4.7  mmol/L Final     Chloride 03/10/2021 105  mmol/L Final     Carbon Dioxide 03/10/2021 23  mmol/L Final     Anion Gap 03/10/2021 11  mmol/L Final     Glucose 03/10/2021 88  70 - 99 mg/dL Final     Urea Nitrogen 03/10/2021 20  mg/dL Final     Creatinine 03/10/2021 1.10  mg/dL Final     Calcium 03/10/2021 8.9  mg/dL Final     Cholesterol 03/10/2021 163  mg/dL Final     Triglycerides 03/10/2021 393  <=149 mg/dL Final     HDL Cholesterol 03/10/2021 31  >=40 mg/dL Final     LDL Cholesterol Calculated 03/10/2021 53  mg/dL Final     Non HDL Cholesterol 03/10/2021 132  mg/dL Final     Cholesterol/HDL Ratio 03/10/2021 5.3   Final     5/3/2022 labs reviewed  Total testosterone 126, free 1.3  Lipid  8 hour fasting:  LDL 34   Estradiol  90    Had PCP visit 5/3/2022, note reivewed and copied forward  120/83, weight 197lb  BMI 30.85  Physical Exam:  General Appearance: alert, well appearing and in no apparent distress  HEENT: lids normal, sclera clear, conjunctiva normal and EOMs intact and oropharynx clear, ear canals clear and TMs normal  Neck: no lymphadenopathy and no thyromegaly or nodules  Heart: regular rate and rhythm, no murmurs, gallops or rubs and normal S1 and S2  Lungs: clear to ausculation and no wheezes, rales or rhonchi  Abdomen: soft, nondistended, nontender, no palpable masses and no organomegaly  Extremities: no edema  Musculoskeletal: FROM  Skin: no rashes or worrisome lesions  Neurologic: normal speech, no facial droop, alert and oriented x 3, normal gait and cranial nerves 2 -12 intact  Psychiatric: affect/mood normal, cooperative, normal judgement/insight and memory  intact       Assessment and Plan     1. Gender dysphoria  2. Elevated TGC  Stable response to current hormone dose  Reviewed labs with patient; unclear whey testosterone level not suppressed--previously suppressed on same doses  Counseled patient on lowering  TGC through diet;  lower simple starches, add more fatty fish to diet    Refills given  Follow-up  6 months           Phone call duration: 15 minutes        Results by mychart  Questions were elicited and answered.     Anish Xiao MD

## 2022-09-08 ENCOUNTER — TELEPHONE (OUTPATIENT)
Dept: FAMILY MEDICINE | Facility: CLINIC | Age: 72
End: 2022-09-08

## 2022-09-08 NOTE — TELEPHONE ENCOUNTER
Approved: Yen 0.1mg Patch    8/25/2022 - 9/8/2023    C# [id number]      Shagufta Vicente CMA      Pharmacy notified

## 2022-09-16 DIAGNOSIS — F64.0 GENDER IDENTITY DISORDER IN ADOLESCENTS OR ADULTS: ICD-10-CM

## 2022-09-16 RX ORDER — SPIRONOLACTONE 100 MG/1
100 TABLET, FILM COATED ORAL DAILY
Qty: 90 TABLET | Refills: 0 | Status: SHIPPED | OUTPATIENT
Start: 2022-09-16 | End: 2022-12-13

## 2022-09-16 NOTE — TELEPHONE ENCOUNTER
Requested Medication: Spironolactone   Dose: 100  Quantity: 90  Refills: 1    Take 1 tablet daily    Last seen at [facility]: 5/26 - 6mo  Next Appointment with Provider:  Visit date not found      Shagufta Vicente CMA

## 2022-12-13 ENCOUNTER — VIRTUAL VISIT (OUTPATIENT)
Dept: FAMILY MEDICINE | Facility: CLINIC | Age: 72
End: 2022-12-13
Payer: COMMERCIAL

## 2022-12-13 DIAGNOSIS — Z79.899 TRANSGENDER PERSON ON HORMONE THERAPY: Primary | ICD-10-CM

## 2022-12-13 DIAGNOSIS — E78.00 HYPERCHOLESTEROLEMIA: ICD-10-CM

## 2022-12-13 DIAGNOSIS — F64.0 TRANSGENDER PERSON ON HORMONE THERAPY: Primary | ICD-10-CM

## 2022-12-13 PROCEDURE — 99213 OFFICE O/P EST LOW 20 MIN: CPT | Mod: 95 | Performed by: FAMILY MEDICINE

## 2022-12-13 RX ORDER — SPIRONOLACTONE 100 MG/1
100 TABLET, FILM COATED ORAL DAILY
Qty: 90 TABLET | Refills: 1 | Status: SHIPPED | OUTPATIENT
Start: 2022-12-13 | End: 2023-06-15

## 2022-12-13 RX ORDER — ESTRADIOL 0.1 MG/D
1 FILM, EXTENDED RELEASE TRANSDERMAL
Qty: 24 PATCH | Refills: 1 | Status: SHIPPED | OUTPATIENT
Start: 2022-12-15 | End: 2023-06-15

## 2022-12-13 NOTE — PROGRESS NOTES
Patient has given verbal consent for Telephone visit?  Yes         LUIS Mueller is a 72 year old individual that uses pronouns She/Her/Hers/Herself that presents today for follow up of:  feminizing hormone therapy.   Alone or accompanied by: accompanied today by      Gender identity: female  Started Hormone  therapy  2013  Continues on TD estradiol  0.1 mg patch 2x/wk and Spironlactone 100* mg daily   Any special concerns today?    Concerned that may want to do labs here in [place], not have janell in information getting from home clinic to here.    Otherwise no questions or concerns      On hormones?  YES +++ Shot day of the week? Not applicable-taking pills/patch/gel      Due for labs?  Yes      +++ Refills of meds needed?  Yes  Gender related body changes since last visit: stable    Breakthrough bleeding? Does Not Apply    New health concerns since last visit:  ---Fell and tripped on high curb, broke toe in foot      No past surgical history on file.    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     HTN (hypertension)     Hypercholesterolemia     Hip fracture, left (H)     Rheumatic fever       Current Outpatient Medications   Medication Sig Dispense Refill     estradiol (MINIVELLE) 0.1 MG/24HR bi-weekly patch Place 1 patch onto the skin twice a week 24 patch 1     simvastatin (ZOCOR) 40 MG tablet Take 1 tablet (40 mg) by mouth At Bedtime (Patient taking differently: Take 20 mg by mouth At Bedtime) 30 tablet 1     spironolactone (ALDACTONE) 100 MG tablet Take 1 tablet (100 mg) by mouth daily 90 tablet 0       History   Smoking Status     Former     Packs/day: 0.30   Smokeless Tobacco     Never        No Known Allergies    Health Maintenance Due   Topic Date Due     MENTAL HEALTH TX PLAN  Never done         Problem, Medication and Allergy Lists were reviewed and are current..         Review of Systems:   Review of Systems           Labs:    Results from last visit:  Virtual Visit on 05/26/2022   Component Date Value Ref Range Status     Testosterone Free 05/03/2022 13.3   Final     Testosterone Total 05/03/2022 126  ng/dL Final     Cholesterol (External) 05/03/2022 182  / - / mg/dL Final     Triglycerides (External) 05/03/2022 334  / - / mg/dL Final     HDL Cholesterol (External) 05/03/2022 34  / - / mg/dL Final     LDL Cholesterol Calculated (Extern* 05/03/2022 81  / - / mg/dL Final     LDL Cholesterol (External) 05/03/2022 /  / - / mg/dL Final     Non HDL Cholesterol (External) 05/03/2022 148  / - / mg/dL Final     Estradiol 05/03/2022 90   Final     Vitals copied forward from podiatry visit on 10/17/22 1403   BP: 124/72   Pulse: 60   Resp: 16   Exam limited by phone: Alert, NAD    Assessment and Plan   1. Transgender person on hormone therapy  2. hypertriglyceridemia  3. Foot fracture  Clinically stable response to current gender affirming hormone regimen.   Labs: Testosterone, estradiol, BMP. Given elevated TGC, also check HgbA1c, TSH  To do in spring  Reviewed 2021 DEXA--normal    Labs:   Will do in spring, can do at [place]  Follow-up 6 months      Total call time 13 mintues         Results by mychart  Questions were elicited and answered.     Anish Xiao MD

## 2022-12-20 DIAGNOSIS — Z79.899 TRANSGENDER PERSON ON HORMONE THERAPY: ICD-10-CM

## 2022-12-20 DIAGNOSIS — F64.0 TRANSGENDER PERSON ON HORMONE THERAPY: ICD-10-CM

## 2022-12-20 NOTE — TELEPHONE ENCOUNTER
Requested Medication: Spironolactone (Aldactone) 100 mg tablet   Dose: 100 mg       Quantity: 90 tablets   Refills: 0    Take 1 tablet (100 mg) by mouth daily    Last seen at [facility]: 12/13/2022  Next Appointment with Provider:  Visit date not found    GOVIND Christina

## 2022-12-21 RX ORDER — SPIRONOLACTONE 100 MG/1
100 TABLET, FILM COATED ORAL DAILY
Qty: 90 TABLET | Refills: 1 | OUTPATIENT
Start: 2022-12-21

## 2023-06-12 ENCOUNTER — LAB (OUTPATIENT)
Dept: LAB | Facility: CLINIC | Age: 73
End: 2023-06-12
Payer: COMMERCIAL

## 2023-06-12 DIAGNOSIS — Z79.899 TRANSGENDER PERSON ON HORMONE THERAPY: ICD-10-CM

## 2023-06-12 DIAGNOSIS — E78.00 HYPERCHOLESTEROLEMIA: ICD-10-CM

## 2023-06-12 DIAGNOSIS — F64.0 TRANSGENDER PERSON ON HORMONE THERAPY: ICD-10-CM

## 2023-06-12 LAB
ANION GAP SERPL CALCULATED.3IONS-SCNC: 13 MMOL/L (ref 7–15)
BUN SERPL-MCNC: 13.5 MG/DL (ref 8–23)
CALCIUM SERPL-MCNC: 9.4 MG/DL (ref 8.8–10.2)
CHLORIDE SERPL-SCNC: 102 MMOL/L (ref 98–107)
CREAT SERPL-MCNC: 1.16 MG/DL (ref 0.51–1.17)
DEPRECATED HCO3 PLAS-SCNC: 23 MMOL/L (ref 22–29)
ESTRADIOL SERPL-MCNC: 77 PG/ML
GFR SERPL CREATININE-BSD FRML MDRD: 67 ML/MIN/1.73M2
GLUCOSE SERPL-MCNC: 98 MG/DL (ref 70–99)
HBA1C MFR BLD: 6 %
POTASSIUM SERPL-SCNC: 4.7 MMOL/L (ref 3.4–5.3)
SHBG SERPL-SCNC: 79 NMOL/L (ref 11–135)
SODIUM SERPL-SCNC: 138 MMOL/L (ref 136–145)
TSH SERPL DL<=0.005 MIU/L-ACNC: 2 UIU/ML (ref 0.3–4.2)

## 2023-06-12 PROCEDURE — 82670 ASSAY OF TOTAL ESTRADIOL: CPT | Mod: ORL | Performed by: FAMILY MEDICINE

## 2023-06-12 PROCEDURE — 84270 ASSAY OF SEX HORMONE GLOBUL: CPT | Mod: ORL | Performed by: FAMILY MEDICINE

## 2023-06-12 PROCEDURE — 84403 ASSAY OF TOTAL TESTOSTERONE: CPT | Mod: ORL | Performed by: FAMILY MEDICINE

## 2023-06-12 PROCEDURE — 84443 ASSAY THYROID STIM HORMONE: CPT | Mod: ORL | Performed by: FAMILY MEDICINE

## 2023-06-12 PROCEDURE — 80048 BASIC METABOLIC PNL TOTAL CA: CPT | Mod: ORL | Performed by: FAMILY MEDICINE

## 2023-06-14 LAB
TESTOST FREE SERPL-MCNC: 1.41 NG/DL
TESTOST SERPL-MCNC: 139 NG/DL (ref 8–950)

## 2023-06-15 ENCOUNTER — VIRTUAL VISIT (OUTPATIENT)
Dept: FAMILY MEDICINE | Facility: CLINIC | Age: 73
End: 2023-06-15
Payer: COMMERCIAL

## 2023-06-15 DIAGNOSIS — E78.00 HYPERCHOLESTEROLEMIA: ICD-10-CM

## 2023-06-15 DIAGNOSIS — F64.0 TRANSGENDER PERSON ON HORMONE THERAPY: ICD-10-CM

## 2023-06-15 DIAGNOSIS — Z79.899 TRANSGENDER PERSON ON HORMONE THERAPY: ICD-10-CM

## 2023-06-15 DIAGNOSIS — R73.03 PREDIABETES: Primary | ICD-10-CM

## 2023-06-15 PROCEDURE — 99214 OFFICE O/P EST MOD 30 MIN: CPT | Mod: VID | Performed by: FAMILY MEDICINE

## 2023-06-15 RX ORDER — SPIRONOLACTONE 100 MG/1
100 TABLET, FILM COATED ORAL DAILY
Qty: 90 TABLET | Refills: 1 | Status: SHIPPED | OUTPATIENT
Start: 2023-06-15 | End: 2023-07-19

## 2023-06-15 RX ORDER — ESTRADIOL 0.1 MG/D
1 FILM, EXTENDED RELEASE TRANSDERMAL
Qty: 24 PATCH | Refills: 1 | Status: SHIPPED | OUTPATIENT
Start: 2023-06-15 | End: 2023-07-19

## 2023-06-15 ASSESSMENT — ENCOUNTER SYMPTOMS
FEVER: 0
UNEXPECTED WEIGHT CHANGE: 0
CHILLS: 0
LIGHT-HEADEDNESS: 0
SHORTNESS OF BREATH: 0

## 2023-06-15 NOTE — NURSING NOTE
Is the patient currently in the state of [place]? YES    Visit mode:VIDEO    If the visit is dropped, the patient can be reconnected by: VIDEO VISIT: Text to cell phone:   Telephone Information:   Mobile [phone]       Will anyone else be joining the visit? No  (If patient encounters technical issues they should call [phone])    How would you like to obtain your AVS? Mail a copy    Are changes needed to the allergy or medication list? NO    Rooming Documentation: Assigned questionnaire(s) completed .    Reason for visit: RECHECK     PEREZ Mar

## 2023-06-15 NOTE — PROGRESS NOTES
Patient has given verbal consent for Telephone visit?  Yes         HPI     Ami is a 73 year old individual that uses pronouns She/Her/Hers/Herself that presents today for follow up of:  feminizing hormone therapy.   Alone or accompanied by: accompanied today by      Gender identity: female  Started Hormone  therapy  2013  Continues on TD estradiol  0.1 mg patch 2x/wk and Spironlactone 100* mg daily   Any special concerns today?    Moved to [place], move has been stressful, better now.      On hormones?  YES +++ Shot day of the week? Not applicable-taking pills/patch/gel      Due for labs?  Yes      +++ Refills of meds needed?  Yes  Gender related body changes since last visit: stable    Breakthrough bleeding? Does Not Apply    New health concerns since last visit:  ---none, has appointment with PCP next week, general check up    No past surgical history on file.    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     HTN (hypertension)     Hypercholesterolemia     Hip fracture, left (H)     Rheumatic fever       Current Outpatient Medications   Medication Sig Dispense Refill     estradiol (MINIVELLE) 0.1 MG/24HR bi-weekly patch Place 1 patch onto the skin twice a week 24 patch 1     simvastatin (ZOCOR) 40 MG tablet Take 1 tablet (40 mg) by mouth At Bedtime (Patient taking differently: Take 20 mg by mouth At Bedtime) 30 tablet 1     spironolactone (ALDACTONE) 100 MG tablet Take 1 tablet (100 mg) by mouth daily 90 tablet 1       History   Smoking Status     Former     Packs/day: 0.30   Smokeless Tobacco     Never        No Known Allergies    Health Maintenance Due   Topic Date Due     MENTAL HEALTH TX PLAN  Never done         Problem, Medication and Allergy Lists were reviewed and are current..         Review of Systems:   Review of Systems   Constitutional: Negative for chills, fever and unexpected weight change.   Respiratory: Negative for shortness of  breath.    Cardiovascular: Negative for chest pain and leg swelling.   Neurological: Negative for light-headedness.              Labs:   Results from last visit:  Lab on 06/12/2023   Component Date Value Ref Range Status     Estradiol 06/12/2023 77  pg/mL Final    Healthy Men:   11.3-43.2 pg/mL    Healthy Postmenopausal Women:  Postmenopause: <5-138 pg/mL    Healthy Pregnant Women:  1st trimester: 154-3243 pg/mL  2nd trimester: 1561-11271 pg/mL  3rd trimester: 8525->84898 pg/mL    Healthy Women Cycle Phase:  Follicular: 30.9-90.4 pg/mL  Ovulation: 60.4-533 pg/mL  Luteal: 60.4-232 pg/mL    Healthy Women Cycle Sub-Phase:  Early Follicular: 20.5-62.8 pg/mL  Intermediate Follicular: 26-79.8 pg/mL  Late Follicular: 49.5-233 pg/mL  Ovulation: 60.4-602 pg/mL  Early Luteal: 51.1-179 pg/mL  Intermediate Luteal: 66.5-305 pg/mL  Late Luteal: 30.2-222 pg/mL     Sodium 06/12/2023 138  136 - 145 mmol/L Final     Potassium 06/12/2023 4.7  3.4 - 5.3 mmol/L Final     Chloride 06/12/2023 102  98 - 107 mmol/L Final     Carbon Dioxide (CO2) 06/12/2023 23  22 - 29 mmol/L Final     Anion Gap 06/12/2023 13  7 - 15 mmol/L Final     Urea Nitrogen 06/12/2023 13.5  8.0 - 23.0 mg/dL Final     Creatinine 06/12/2023 1.16  0.51 - 1.17 mg/dL Final    Male and Female  0-2 Months    0.31-0.88 mg/dL  2-12 Months   0.16-0.39 mg/dL  1-2 Years     0.18-0.35 mg/dL  3-4 Years     0.26-0.42 mg/dL  5-6 Years     0.29-0.47 mg/dL  7-8 Years     0.34-0.53 mg/dL  9-10 Years    0.33-0.64 mg/dL  11-12 Years   0.44-0.68 mg/dL  13-14 Years   0.46-0.77 mg/dL    Female  15 Years and older  0.51-0.95 mg/dL    Male  15 Years and older  0.67-1.17 mg/dL         Calcium 06/12/2023 9.4  8.8 - 10.2 mg/dL Final     Glucose 06/12/2023 98  70 - 99 mg/dL Final     GFR Estimate 06/12/2023 67  >60 mL/min/1.73m2 Final    GFR not calculated when sex unspecified or nonbinary.  eGFR calculated using 2021 CKD-EPI equation.     TSH 06/12/2023 2.00  0.30 - 4.20 uIU/mL Final     Sex  Hormone Binding Globulin 06/12/2023 79  11 - 135 nmol/L Final    Female Reference Range:  Yash Stage I:  nmol/L  Yash Stage II:  nmol/L  Yash Stage III: 12-98 nmol/L  Yash Stage IV:  nmol/L  Yash Stage V:  nmol/L    Male Reference Range:  Yash Stage I:  nmol/L  Yash Stage II:  nmol/L  Yash Stage III:  nmol/L  Yash Stage IV: 11-60 nmol/L  Yash Stage V: 11-71 nmol/L     Free Testosterone Calculated 06/12/2023 1.41  ng/dL Final     Testosterone Total 06/12/2023 139  8 - 950 ng/dL Final    Comment:   MALE:  0 to 5 months:  ng/dL  6 months to 9 years: <2-20 ng/dL  10 to 11 years: <2-130 ng/dL  12 to 13 years: <2-800 ng/dL  14 years: <2-1200 ng/dL  15 to 16 years: 100-1200 ng/dL  17 to 18 years: 300-1200 ng/dL  19 years and older: 240-950 ng/dL    FEMALE:  0 to 5 months: 20-80 ng/dL  6 months to 9 years: <2-20 ng/dL  10 to 11 years: <2-44 ng/dL  12 to 16 years: <2-75 ng/dL  17 to 18 years: 20-75 ng/dL  19 years and older: 8-60 ng/dL    Values by Yash Stage:  Stage I (prepubertal)  Male: <2 to 20 ng/dL  Female: <2 to 20 ng/dL    Stage II  Male: 8 to 66 ng/dL  Female: <2 to 47 ng/dL    Stage III  Male: 26 to 800 ng/dL  Female: 17 to 75 ng/dL    Stage IV  Male: 85 to 1200 ng/dL  Female: 20 to 75 ng/dL    Stage V (young adult)  Male: 300 to 950 ng/dL  Female: 12 to 60 ng/dL    Puberty onset (transition from Yash Stage I to Yash Stage II) occurs for boys at a median age of 11.5 years and for girls at median age of 10.5 years. There is evidence that it may occur up to 1 year                            earlier in obese girls and in  girls. For boys there is no definite proven relationship between puberty onset and body weight or ethnic origin. Progression through Yash stages is variable. Yash Stage V (young adult) should be reached by age 18.         Hemoglobin A1C 06/12/2023 6.0 (H)  <5.7 % Final    Normal <5.7%   Prediabetes 5.7-6.4%     Diabetes 6.5% or higher     Note: Adopted from ADA consensus guidelines.     Alert, NAD    Assessment and Plan   1. Transgender person on hormone therapy  2. Pre DM  Reviewed labs, within normal/appropriate range except HgbA1c at 6.0 and   Clinically appropriate response to current gender affirming hormone regimen.   Counseled patient regarding nature and treatment of  preDM and recommend discusss diet and activity with PCP  Counseled that elevated TGC noted previously likely to come down if blood sugar comes down.      Follow-up 6 months             Phone call duration: 18 minutes        [facility]  Questions were elicited and answered.     Anish Xiao MD

## 2023-06-15 NOTE — PROGRESS NOTES
Virtual Visit Details    Type of service:  Video Visit Originating Location (pt. Location): Home  Distant Location (provider location):  On-site  Platform used for Video Visit: Gabriele

## 2023-06-23 PROBLEM — R73.03 PREDIABETES: Status: ACTIVE | Noted: 2023-06-23

## 2023-07-18 DIAGNOSIS — F64.0 TRANSGENDER PERSON ON HORMONE THERAPY: ICD-10-CM

## 2023-07-18 DIAGNOSIS — Z79.899 TRANSGENDER PERSON ON HORMONE THERAPY: ICD-10-CM

## 2023-07-18 NOTE — TELEPHONE ENCOUNTER
Requested Medication: Estradiol 0.1mg Patch  Dose: 0.1mg  Quantity: 24  Refills: 1    Apply 1 patch twice weekly  _____    Requested Medication: Spironolactone 100mg  Dose: 100mg  Quantity: 90  Refills: 1    Take 1 tablet daily    Last seen at [facility]: 6/15/2023 - 6 mo  Next Appointment with Provider:  Visit date not found    Shagufta Vicente CMA

## 2023-07-19 RX ORDER — SPIRONOLACTONE 100 MG/1
100 TABLET, FILM COATED ORAL DAILY
Qty: 90 TABLET | Refills: 1 | Status: SHIPPED | OUTPATIENT
Start: 2023-07-19 | End: 2024-01-17

## 2023-07-19 RX ORDER — ESTRADIOL 0.1 MG/D
1 FILM, EXTENDED RELEASE TRANSDERMAL
Qty: 24 PATCH | Refills: 1 | Status: SHIPPED | OUTPATIENT
Start: 2023-07-20 | End: 2024-01-17

## 2023-08-05 ENCOUNTER — TRANSFERRED RECORDS (OUTPATIENT)
Dept: MULTI SPECIALTY CLINIC | Facility: CLINIC | Age: 73
End: 2023-08-05

## 2023-08-28 ENCOUNTER — TELEPHONE (OUTPATIENT)
Dept: FAMILY MEDICINE | Facility: CLINIC | Age: 73
End: 2023-08-28
Payer: COMMERCIAL

## 2023-08-28 NOTE — CONFIDENTIAL NOTE
Approved: Yen 0.1 mg Patch    8/11/2023 - 8/24/2024    C# [id number]      Shagufta Vicente,[facility]

## 2024-01-17 DIAGNOSIS — F64.0 TRANSGENDER PERSON ON HORMONE THERAPY: ICD-10-CM

## 2024-01-17 DIAGNOSIS — Z79.899 TRANSGENDER PERSON ON HORMONE THERAPY: ICD-10-CM

## 2024-01-17 RX ORDER — SPIRONOLACTONE 100 MG/1
100 TABLET, FILM COATED ORAL DAILY
Qty: 90 TABLET | Refills: 0 | Status: SHIPPED | OUTPATIENT
Start: 2024-01-17 | End: 2024-04-23

## 2024-01-17 RX ORDER — ESTRADIOL 0.1 MG/D
1 FILM, EXTENDED RELEASE TRANSDERMAL
Qty: 24 PATCH | Refills: 0 | Status: SHIPPED | OUTPATIENT
Start: 2024-01-18 | End: 2024-04-23

## 2024-01-17 NOTE — TELEPHONE ENCOUNTER
Requested Medication:  Spironolactone   Dose:   100 MG  Quantity:  90  Refills:  1    Take 1 tablet daily  _____    Requested Medication:  Estradiol 0.1 MG Patch  Dose:   0.1 MG  Quantity:  24  Refills:  1    Apply 1 patch twice weekly    Last seen at [facility]:  6/15/2023 - 6 mo  Next Appointment with Provider:  2/15/2024      Shagufta Vicente CMA

## 2024-02-15 ENCOUNTER — OFFICE VISIT (OUTPATIENT)
Dept: FAMILY MEDICINE | Facility: CLINIC | Age: 74
End: 2024-02-15
Payer: COMMERCIAL

## 2024-02-15 VITALS
WEIGHT: 190.4 LBS | HEART RATE: 97 BPM | DIASTOLIC BLOOD PRESSURE: 84 MMHG | HEIGHT: 67 IN | SYSTOLIC BLOOD PRESSURE: 128 MMHG | BODY MASS INDEX: 29.88 KG/M2

## 2024-02-15 DIAGNOSIS — E78.00 HYPERCHOLESTEROLEMIA: ICD-10-CM

## 2024-02-15 DIAGNOSIS — Z79.899 TRANSGENDER PERSON ON HORMONE THERAPY: Primary | ICD-10-CM

## 2024-02-15 DIAGNOSIS — F64.0 TRANSGENDER PERSON ON HORMONE THERAPY: Primary | ICD-10-CM

## 2024-02-15 DIAGNOSIS — R73.03 PREDIABETES: ICD-10-CM

## 2024-02-15 PROCEDURE — 99214 OFFICE O/P EST MOD 30 MIN: CPT | Performed by: FAMILY MEDICINE

## 2024-02-15 ASSESSMENT — ENCOUNTER SYMPTOMS: SHORTNESS OF BREATH: 0

## 2024-02-15 NOTE — PROGRESS NOTES
HPI     Ami is a 73 year old individual that uses pronouns She/Her/Hers/Herself that presents today for follow up of:  feminizing hormone therapy.   Alone or accompanied by: accompanied today by  Gender identity: female  Started Hormone  therapy  2013  Continues on TD estradiol  0.1 mg patch 2x/wk and Spironlactone 100* mg daily   Any special concerns today?    No new concerns, doing well on current GAHT  Did not see PCP about blood sugars yet, will be going in for annual exam in March  Likes PCP, but not clinic as a whole  On hormones?  YES +++ Shot day of the week? Not applicable-taking pills/patch/gel      Due for labs?  Yes      +++ Refills of meds needed?  Yes  Gender related body changes since last visit: stable    Breakthrough bleeding? Does Not Apply    New health concerns since last visit:  ---none    No past surgical history on file.    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    HTN (hypertension)    Hypercholesterolemia    Hip fracture, left (H)    Rheumatic fever    Prediabetes       Current Outpatient Medications   Medication Sig Dispense Refill    estradiol (MINIVELLE) 0.1 MG/24HR bi-weekly patch Place 1 patch onto the skin twice a week 24 patch 0    simvastatin (ZOCOR) 40 MG tablet Take 1 tablet (40 mg) by mouth At Bedtime (Patient taking differently: Take 20 mg by mouth At Bedtime) 30 tablet 1    spironolactone (ALDACTONE) 100 MG tablet Take 1 tablet (100 mg) by mouth daily 90 tablet 0       History   Smoking Status    Former    Packs/day: 0.30   Smokeless Tobacco    Never   Was walking, less so during winter     No Known Allergies    Health Maintenance Due   Topic Date Due    MENTAL HEALTH TX PLAN  Never done         Problem, Medication and Allergy Lists were reviewed and are current..         Review of Systems:   Review of Systems   Respiratory:  Negative for shortness of breath.    Cardiovascular:  Negative for chest pain and  leg swelling.              Labs:   Results from last visit:  Lab on 06/12/2023   Component Date Value Ref Range Status    Estradiol 06/12/2023 77  pg/mL Final    Healthy Men:   11.3-43.2 pg/mL    Healthy Postmenopausal Women:  Postmenopause: <5-138 pg/mL    Healthy Pregnant Women:  1st trimester: 154-3243 pg/mL  2nd trimester: 1561-99472 pg/mL  3rd trimester: 8525->63564 pg/mL    Healthy Women Cycle Phase:  Follicular: 30.9-90.4 pg/mL  Ovulation: 60.4-533 pg/mL  Luteal: 60.4-232 pg/mL    Healthy Women Cycle Sub-Phase:  Early Follicular: 20.5-62.8 pg/mL  Intermediate Follicular: 26-79.8 pg/mL  Late Follicular: 49.5-233 pg/mL  Ovulation: 60.4-602 pg/mL  Early Luteal: 51.1-179 pg/mL  Intermediate Luteal: 66.5-305 pg/mL  Late Luteal: 30.2-222 pg/mL    Sodium 06/12/2023 138  136 - 145 mmol/L Final    Potassium 06/12/2023 4.7  3.4 - 5.3 mmol/L Final    Chloride 06/12/2023 102  98 - 107 mmol/L Final    Carbon Dioxide (CO2) 06/12/2023 23  22 - 29 mmol/L Final    Anion Gap 06/12/2023 13  7 - 15 mmol/L Final    Urea Nitrogen 06/12/2023 13.5  8.0 - 23.0 mg/dL Final    Creatinine 06/12/2023 1.16  0.51 - 1.17 mg/dL Final    Male and Female  0-2 Months    0.31-0.88 mg/dL  2-12 Months   0.16-0.39 mg/dL  1-2 Years     0.18-0.35 mg/dL  3-4 Years     0.26-0.42 mg/dL  5-6 Years     0.29-0.47 mg/dL  7-8 Years     0.34-0.53 mg/dL  9-10 Years    0.33-0.64 mg/dL  11-12 Years   0.44-0.68 mg/dL  13-14 Years   0.46-0.77 mg/dL    Female  15 Years and older  0.51-0.95 mg/dL    Male  15 Years and older  0.67-1.17 mg/dL        Calcium 06/12/2023 9.4  8.8 - 10.2 mg/dL Final    Glucose 06/12/2023 98  70 - 99 mg/dL Final    GFR Estimate 06/12/2023 67  >60 mL/min/1.73m2 Final    GFR not calculated when sex unspecified or nonbinary.  eGFR calculated using 2021 CKD-EPI equation.    TSH 06/12/2023 2.00  0.30 - 4.20 uIU/mL Final    Sex Hormone Binding Globulin 06/12/2023 79  11 - 135 nmol/L Final    Female Reference Range:  Yash Stage I:   nmol/L  Yash Stage II:  nmol/L  Yash Stage III: 12-98 nmol/L  Yash Stage IV:  nmol/L  Yash Stage V:  nmol/L    Male Reference Range:  Yash Stage I:  nmol/L  Yash Stage II:  nmol/L  Yash Stage III:  nmol/L  Yash Stage IV: 11-60 nmol/L  Yash Stage V: 11-71 nmol/L    Free Testosterone Calculated 06/12/2023 1.41  ng/dL Final    Testosterone Total 06/12/2023 139  8 - 950 ng/dL Final    Comment:   MALE:  0 to 5 months:  ng/dL  6 months to 9 years: <2-20 ng/dL  10 to 11 years: <2-130 ng/dL  12 to 13 years: <2-800 ng/dL  14 years: <2-1200 ng/dL  15 to 16 years: 100-1200 ng/dL  17 to 18 years: 300-1200 ng/dL  19 years and older: 240-950 ng/dL    FEMALE:  0 to 5 months: 20-80 ng/dL  6 months to 9 years: <2-20 ng/dL  10 to 11 years: <2-44 ng/dL  12 to 16 years: <2-75 ng/dL  17 to 18 years: 20-75 ng/dL  19 years and older: 8-60 ng/dL    Values by Yash Stage:  Stage I (prepubertal)  Male: <2 to 20 ng/dL  Female: <2 to 20 ng/dL    Stage II  Male: 8 to 66 ng/dL  Female: <2 to 47 ng/dL    Stage III  Male: 26 to 800 ng/dL  Female: 17 to 75 ng/dL    Stage IV  Male: 85 to 1200 ng/dL  Female: 20 to 75 ng/dL    Stage V (young adult)  Male: 300 to 950 ng/dL  Female: 12 to 60 ng/dL    Puberty onset (transition from Yash Stage I to Yash Stage II) occurs for boys at a median age of 11.5 years and for girls at median age of 10.5 years. There is evidence that it may occur up to 1 year                            earlier in obese girls and in  girls. For boys there is no definite proven relationship between puberty onset and body weight or ethnic origin. Progression through Yash stages is variable. Yash Stage V (young adult) should be reached by age 18.        Hemoglobin A1C 06/12/2023 6.0 (H)  <5.7 % Final    Normal <5.7%   Prediabetes 5.7-6.4%    Diabetes 6.5% or higher     Note: Adopted from ADA consensus guidelines.         EXAM:  Blood pressure 128/84,  "pulse 97, height 1.702 m (5' 7\"), weight 86.4 kg (190 lb 6.4 oz).  Body mass index is 29.82 kg/m .    Vitals reviewed  Constitutional: healthy, alert, and no distress   Cardiovascular: negative, PMI normal. No lifts, heaves, or thrills. RRR. No murmurs, clicks gallops or rub  Respiratory: negative, Percussion normal. Good diaphragmatic excursion. Lungs clear   No edema   Assessment and Plan   Transgender person on hormone therapy  Prediabetes  Hypertriglyceridemia  Clinically appropriate response to current gender affirming hormone regimen.    Counseled patient regarding the nature and treatment of prediabetes, recommend follow-up with PCP regarding   Reviewed options for alternative clinics for care, including [facility] preventive care visit  Has refills printed at home  Fasting labs: Fasting lipid panel   hgbA1c     Follow-up 6-12 months          Results by javier  Questions were elicited and answered.     Anish Xiao MD    "

## 2024-04-23 DIAGNOSIS — F64.0 TRANSGENDER PERSON ON HORMONE THERAPY: ICD-10-CM

## 2024-04-23 DIAGNOSIS — Z79.899 TRANSGENDER PERSON ON HORMONE THERAPY: ICD-10-CM

## 2024-04-23 RX ORDER — ESTRADIOL 0.1 MG/D
1 FILM, EXTENDED RELEASE TRANSDERMAL
Qty: 24 PATCH | Refills: 0 | Status: SHIPPED | OUTPATIENT
Start: 2024-04-25 | End: 2024-07-01

## 2024-04-23 RX ORDER — SPIRONOLACTONE 100 MG/1
100 TABLET, FILM COATED ORAL DAILY
Qty: 90 TABLET | Refills: 0 | Status: SHIPPED | OUTPATIENT
Start: 2024-04-23 | End: 2024-05-03

## 2024-04-23 NOTE — CONFIDENTIAL NOTE
Requested Medication:  Spironolactone 100 MG  Dose:   100 mg  Quantity:  90  Refills:  0    Take 1 tablet daily  _____    Requested Medication:  Estradiol 0.1 MG Bi-Weekly Patch  Dose:   0.1 mg  Quantity:  24  Refills:  0    Apply 1 patch onto skin twice weekly    Last seen at [facility]:  2/15/2024 - follow up 6/12  Next Appointment with Provider:  Visit date not found       Shagufta Vicente CMA

## 2024-05-02 DIAGNOSIS — Z79.899 TRANSGENDER PERSON ON HORMONE THERAPY: ICD-10-CM

## 2024-05-02 DIAGNOSIS — F64.0 TRANSGENDER PERSON ON HORMONE THERAPY: ICD-10-CM

## 2024-05-02 NOTE — TELEPHONE ENCOUNTER
Requested Medication:  Spironolactone 100 MG  Dose:   100 MG  Quantity:  90  Refills:  1    Take 1 tablet daily    Last seen at [facility]:  2/2024 - Follow up 6/12 mo  Next Appointment with Provider:  Visit date not found     Previous written script was not received by pharmacy.    Shagufta Vicente CMA

## 2024-05-03 RX ORDER — SPIRONOLACTONE 100 MG/1
100 TABLET, FILM COATED ORAL DAILY
Qty: 90 TABLET | Refills: 1 | OUTPATIENT
Start: 2024-05-03

## 2024-05-03 RX ORDER — SPIRONOLACTONE 100 MG/1
100 TABLET, FILM COATED ORAL DAILY
Qty: 90 TABLET | Refills: 0 | Status: SHIPPED | OUTPATIENT
Start: 2024-05-03 | End: 2024-07-31

## 2024-07-01 DIAGNOSIS — F64.0 TRANSGENDER PERSON ON HORMONE THERAPY: ICD-10-CM

## 2024-07-01 DIAGNOSIS — Z79.899 TRANSGENDER PERSON ON HORMONE THERAPY: ICD-10-CM

## 2024-07-01 RX ORDER — ESTRADIOL 0.1 MG/D
1 FILM, EXTENDED RELEASE TRANSDERMAL
Qty: 24 PATCH | Refills: 0 | Status: SHIPPED | OUTPATIENT
Start: 2024-07-01 | End: 2024-10-01

## 2024-07-01 NOTE — TELEPHONE ENCOUNTER
Requested Medication:  Estradiol 0.1 mg Patch  Dose:   0.1 mg  Quantity:  24  Refills:  0    Apply 1 patch twice weekly    Last seen at [facility]:  2/15/24 - follow up 6/12 mo  Next Appointment with Provider:  Visit date not found       Shagufta Vicente CMA

## 2024-07-31 DIAGNOSIS — Z79.899 TRANSGENDER PERSON ON HORMONE THERAPY: ICD-10-CM

## 2024-07-31 DIAGNOSIS — F64.0 TRANSGENDER PERSON ON HORMONE THERAPY: ICD-10-CM

## 2024-07-31 RX ORDER — SPIRONOLACTONE 100 MG/1
100 TABLET, FILM COATED ORAL DAILY
Qty: 90 TABLET | Refills: 0 | Status: SHIPPED | OUTPATIENT
Start: 2024-07-31

## 2024-07-31 NOTE — TELEPHONE ENCOUNTER
[facility]    Patient Name: Jaclyn Rausch  YOB: 1980    PRESENTING HISTORY     History of Present Illness:  Ms. Jaclyn Rausch is a 43 y.o. female here for annual.     She is feeling fine and has no sig complaints.     Chronic AC- hypercoagulable. Seeing H/o.     Hx CVA- sees Cardiology. On statin. Dose reduced. Tried to get coronary calcium to try and de-escalate/stop statin but couldn't reach HR target. Based on her old LDL I would recommend just continuing.     Had lid droop bilaterally. Saw Neurology. Abs, MRI and EMG negative.     She has been exercising significantly. Frustrated in weight loss attempts. Tried victoza in past and had sig AEs. She is going to look into menopausal testing and hormone replacement through her endocrinologist.     ROS      OBJECTIVE:   Vital Signs:  Vitals:    06/07/24 1329   BP: 130/80   Pulse: 83   Resp: 16   SpO2: 98%   Weight: 84.6 kg (186 lb 8.2 oz)   Height: 5' (1.524 m)          Physical Exam: Normal, no change.     Physical Exam    ASSESSMENT & PLAN:     Routine general medical examination at a health care facility  -     LIPID PANEL; Future; Expected date: 06/07/2024    Severe obesity (BMI 35.0-39.9) with comorbidity  See discussion above. Stable    Clotting disorder  Stable, continue current medications    Familial hypercholesterolemia  Stable, continue current medications       Jarod Walker  Internal Medicine             Requested Medication:  Spironolactone 100 MG  Dose:   100 mg  Quantity:  90  Refills:  0    Take 1 tablet daily    Last seen at [facility]:  2/15 2024 - follow up 6-12 mo  Next Appointment with Provider:  Visit date not found       Shagufta Vicente CMA

## 2024-08-12 ENCOUNTER — TELEPHONE (OUTPATIENT)
Dept: FAMILY MEDICINE | Facility: CLINIC | Age: 74
End: 2024-08-12
Payer: COMMERCIAL

## 2024-08-13 NOTE — CONFIDENTIAL NOTE
Approved: Yen 0.1 mg biweekly patch    7/13/2024 - 8/12/2025    C# [id number]      Shagufta Vicente CMA     Pharmacy Notified

## 2024-09-09 ENCOUNTER — LAB (OUTPATIENT)
Dept: LAB | Facility: CLINIC | Age: 74
End: 2024-09-09
Payer: COMMERCIAL

## 2024-09-09 DIAGNOSIS — R73.03 BORDERLINE DIABETES: Primary | ICD-10-CM

## 2024-09-09 DIAGNOSIS — E78.00 HYPERCHOLESTEREMIA: ICD-10-CM

## 2024-09-09 LAB
CHOLEST SERPL-MCNC: 171 MG/DL
FASTING STATUS PATIENT QL REPORTED: YES
HBA1C MFR BLD: 5.7 % (ref 0–5.6)
HDLC SERPL-MCNC: 36 MG/DL
LDLC SERPL CALC-MCNC: 95 MG/DL
NONHDLC SERPL-MCNC: 135 MG/DL
TRIGL SERPL-MCNC: 198 MG/DL

## 2024-09-09 PROCEDURE — 80061 LIPID PANEL: CPT

## 2024-09-09 PROCEDURE — 36415 COLL VENOUS BLD VENIPUNCTURE: CPT

## 2024-09-09 PROCEDURE — 83036 HEMOGLOBIN GLYCOSYLATED A1C: CPT

## 2024-09-11 ENCOUNTER — OFFICE VISIT (OUTPATIENT)
Dept: FAMILY MEDICINE | Facility: CLINIC | Age: 74
End: 2024-09-11
Payer: COMMERCIAL

## 2024-09-11 VITALS
SYSTOLIC BLOOD PRESSURE: 125 MMHG | TEMPERATURE: 97.8 F | HEIGHT: 67 IN | DIASTOLIC BLOOD PRESSURE: 80 MMHG | WEIGHT: 195.2 LBS | RESPIRATION RATE: 16 BRPM | HEART RATE: 68 BPM | OXYGEN SATURATION: 97 % | BODY MASS INDEX: 30.64 KG/M2

## 2024-09-11 DIAGNOSIS — Z00.00 WELLNESS EXAMINATION: ICD-10-CM

## 2024-09-11 DIAGNOSIS — R73.03 PREDIABETES: ICD-10-CM

## 2024-09-11 DIAGNOSIS — Z00.00 ENCOUNTER FOR MEDICARE ANNUAL WELLNESS EXAM: ICD-10-CM

## 2024-09-11 DIAGNOSIS — E66.811 CLASS 1 OBESITY DUE TO EXCESS CALORIES WITHOUT SERIOUS COMORBIDITY WITH BODY MASS INDEX (BMI) OF 30.0 TO 30.9 IN ADULT: Primary | ICD-10-CM

## 2024-09-11 DIAGNOSIS — E66.09 CLASS 1 OBESITY DUE TO EXCESS CALORIES WITHOUT SERIOUS COMORBIDITY WITH BODY MASS INDEX (BMI) OF 30.0 TO 30.9 IN ADULT: Primary | ICD-10-CM

## 2024-09-11 PROCEDURE — 99213 OFFICE O/P EST LOW 20 MIN: CPT | Performed by: FAMILY MEDICINE

## 2024-09-11 PROCEDURE — G2211 COMPLEX E/M VISIT ADD ON: HCPCS | Performed by: FAMILY MEDICINE

## 2024-09-11 RX ORDER — ATORVASTATIN CALCIUM 40 MG/1
40 TABLET, FILM COATED ORAL DAILY
COMMUNITY

## 2024-09-11 NOTE — PROGRESS NOTES
"  Assessment & Plan   Problem List Items Addressed This Visit       Prediabetes     Prediabetes already improving with an A1c of 5.7%, still slightly elevated triglycerides but this should be managed with dietary changes and weight loss.         Class 1 obesity without serious comorbidity with body mass index (BMI) of 30.0 to 30.9 in adult - Primary     Discussed obesity, she is following a relatively healthy diet.  I was going to have refer her to [facility] for nutritional counseling but it appears that this is not covered under her insurance.         Wellness examination     Reviewed care gaps, I provided advance care planning documentation to discuss with family.  She states she had a mammogram about 3 years ago within normal limits.  She will get pending immunizations in the pharmacy due to her Medicare insurance    After her visit was done, I reviewed guidelines for transgender women regarding frequency of mammograms and according to [facility] it is recommended every other year starting at age 50 for people who have been on hormones for at least 5 years.  I will send a message to Ami regarding these recommendations         Relevant Orders    REVIEW OF HEALTH MAINTENANCE PROTOCOL ORDERS (Completed)     Other Visit Diagnoses       Encounter for Medicare annual wellness exam               The longitudinal plan of care for the diagnosis(es)/condition(s) as documented were addressed during this visit. Due to the added complexity in care, I will continue to support Ami in the subsequent management and with ongoing continuity of care.        BMI  Estimated body mass index is 30.57 kg/m  as calculated from the following:    Height as of this encounter: 1.702 m (5' 7\").    Weight as of this encounter: 88.5 kg (195 lb 3.2 oz).     Weight management plan: Discussed dietary changes, I was going to refer her to nutritional counseling but apparently her insurance does not cover this          Subjective   \Stepan is a " "74-year-old transgender woman, has moved to the [facility] and is establishing care.  She is due for a Medicare examination but otherwise she has no concerns.  She sees [facility]. [facility]ish [facility].  She started hormone therapy in 2013.  She is currently on estradiol 0.1 mg per 24-hour biweekly patch and spironolactone 100 mg daily.  She recently had her estradiol and testosterone checked and reviewed with by her doctor.  There were concerns that her triglycerides were going up, A1c was at 6.0% and it was suggested that she establish care closer to where she lives.  Otherwise she has no additional concerns today.        9/11/2024    11:42 AM   Additional Questions   Roomed by Silvia reynoso   Accompanied by Self     Via the Health Maintenance questionnaire, the patient has reported the following services have been completed -Colonscopy: [facility] 2023-08-05, this information has been sent to the abstraction team.  History of Present Illness       Reason for visit:  Review blood test   She is taking medications regularly.     Review of systems   HEENT: she has history of migraines she used to be on Imitrex but now takes Tylenol about once per month, she uses contact sometimes glasses ,she does not have any hearing issues.  She uses partial dentures.    No chest pains or shortness of breath  she reports she has got some hard stools at times despite fiber intake.  Reports no issues with urination.    Occasional knee pain on the right side.  No depression, anxiety or insomnia.        Objective    /80 (BP Location: Left arm, Patient Position: Sitting, Cuff Size: Adult Large)   Pulse 68   Temp 97.8  F (36.6  C)   Resp 16   Ht 1.702 m (5' 7\")   Wt 88.5 kg (195 lb 3.2 oz)   SpO2 97%   BMI 30.57 kg/m    Body mass index is 30.57 kg/m .  Physical Exam   Ami is a delightful patient in good spirits, vital signs normal with exception of elevated BMI of 30.57.  Pupils are equally " reactive to light, oropharynx is clear, tympanic membranes within normal limits.  Heart regular rate and rhythm without murmurs.  Lungs clear to auscultation bilaterally.  Abdomen soft, nontender, nondistended.  Lower extremities without edema.  She has a small area of seborrheic keratosis in her upper back, no other significant skin lesions observed.              Signed Electronically by: Shawn De Los Santos MD

## 2024-09-11 NOTE — ASSESSMENT & PLAN NOTE
Discussed obesity, she is following a relatively healthy diet.  I was going to have refer her to [facility] for nutritional counseling but it appears that this is not covered under her insurance.

## 2024-09-11 NOTE — ASSESSMENT & PLAN NOTE
Reviewed care gaps, I provided advance care planning documentation to discuss with family.  She states she had a mammogram about 3 years ago within normal limits.  She will get pending immunizations in the pharmacy due to her Medicare insurance    After her visit was done, I reviewed guidelines for transgender women regarding frequency of mammograms and according to [facility] it is recommended every other year starting at age 50 for people who have been on hormones for at least 5 years.  I will send a message to Ami regarding these recommendations

## 2024-09-11 NOTE — ASSESSMENT & PLAN NOTE
Prediabetes already improving with an A1c of 5.7%, still slightly elevated triglycerides but this should be managed with dietary changes and weight loss.

## 2024-09-18 NOTE — ADDENDUM NOTE
Addended by: JOE BLANKENSHIP on: 9/18/2024 09:52 AM     Modules accepted: Orders, Level of Service

## 2024-09-18 NOTE — PATIENT INSTRUCTIONS
Patient Education   Preventive Care Advice   This is general advice given by our system to help you stay healthy. However, your care team may have specific advice just for you. Please talk to your care team about your preventive care needs.  Nutrition  Eat 5 or more servings of fruits and vegetables each day.  Try wheat bread, brown rice and whole grain pasta (instead of white bread, rice, and pasta).  Get enough calcium and vitamin D. Check the label on foods and aim for 100% of the RDA (recommended daily allowance).  Lifestyle  Exercise at least 150 minutes each week  (30 minutes a day, 5 days a week).  Do muscle strengthening activities 2 days a week. These help control your weight and prevent disease.  No smoking.  Wear sunscreen to prevent skin cancer.  Have a dental exam and cleaning every 6 months.  Yearly exams  See your health care team every year to talk about:  Any changes in your health.  Any medicines your care team has prescribed.  Preventive care, family planning, and ways to prevent chronic diseases.  Shots (vaccines)   HPV shots (up to age 26), if you've never had them before.  Hepatitis B shots (up to age 59), if you've never had them before.  COVID-19 shot: Get this shot when it's due.  Flu shot: Get a flu shot every year.  Tetanus shot: Get a tetanus shot every 10 years.  Pneumococcal, hepatitis A, and RSV shots: Ask your care team if you need these based on your risk.  Shingles shot (for age 50 and up)  General health tests  Diabetes screening:  Starting at age 35, Get screened for diabetes at least every 3 years.  If you are younger than age 35, ask your care team if you should be screened for diabetes.  Cholesterol test: At age 39, start having a cholesterol test every 5 years, or more often if advised.  Bone density scan (DEXA): At age 50, ask your care team if you should have this scan for osteoporosis (brittle bones).  Hepatitis C: Get tested at least once in your life.  STIs (sexually  transmitted infections)  Before age 24: Ask your care team if you should be screened for STIs.  After age 24: Get screened for STIs if you're at risk. You are at risk for STIs (including HIV) if:  You are sexually active with more than one person.  You don't use condoms every time.  You or a partner was diagnosed with a sexually transmitted infection.  If you are at risk for HIV, ask about PrEP medicine to prevent HIV.  Get tested for HIV at least once in your life, whether you are at risk for HIV or not.  Cancer screening tests  Cervical cancer screening: If you have a cervix, begin getting regular cervical cancer screening tests starting at age 21.  Breast cancer scan (mammogram): If you've ever had breasts, begin having regular mammograms starting at age 40. This is a scan to check for breast cancer.  Colon cancer screening: It is important to start screening for colon cancer at age 45.  Have a colonoscopy test every 10 years (or more often if you're at risk) Or, ask your provider about stool tests like a FIT test every year or Cologuard test every 3 years.  To learn more about your testing options, visit:   .  For help making a decision, visit:   https://bit.ly/ny59474.  Prostate cancer screening test: If you have a prostate, ask your care team if a prostate cancer screening test (PSA) at age 55 is right for you.  Lung cancer screening: If you are a current or former smoker ages 50 to 80, ask your care team if ongoing lung cancer screenings are right for you.  For informational purposes only. Not to replace the advice of your health care provider. Copyright   2023 [employer]. All rights reserved. Clinically reviewed by the [facility]. [employer] [id number] - REV 01/24.

## 2024-10-01 DIAGNOSIS — Z79.899 TRANSGENDER PERSON ON HORMONE THERAPY: ICD-10-CM

## 2024-10-01 DIAGNOSIS — F64.0 TRANSGENDER PERSON ON HORMONE THERAPY: ICD-10-CM

## 2024-10-01 RX ORDER — ESTRADIOL 0.1 MG/D
1 FILM, EXTENDED RELEASE TRANSDERMAL
Qty: 24 PATCH | Refills: 3 | Status: SHIPPED | OUTPATIENT
Start: 2024-10-03

## 2024-10-01 NOTE — TELEPHONE ENCOUNTER
Medication Question or Refill        What medication are you calling about (include dose and sig)?:     estradiol (MINIVELLE) 0.1 MG/24HR bi-weekly patch       Preferred Pharmacy:       [id number] - [place], [place] - [address] AT [facility] [address]  [address] [place] [address]  Phone: [phone] Fax: [phone]      Controlled Substance Agreement on file:   CSA -- Patient Level:    CSA: None found at the patient level.       Who prescribed the medication?: originally prescribed by Anish Xiao at [facility]    Do you need a refill? Yes    When did you use the medication last? 9/27/2024    Patient offered an appointment? No    Do you have any questions or concerns?  Yes: can Dr Morgan fill this, Ami has not been able to reach Dr Xiao, nor has the pharmacy.      Okay to leave a detailed message?: Yes at Cell number on file:    Telephone Information:   Mobile [phone]

## 2024-10-08 DIAGNOSIS — E78.00 HYPERCHOLESTEROLEMIA: Primary | ICD-10-CM

## 2024-10-08 RX ORDER — ATORVASTATIN CALCIUM 40 MG/1
40 TABLET, FILM COATED ORAL DAILY
Qty: 30 TABLET | Refills: 2 | Status: SHIPPED | OUTPATIENT
Start: 2024-10-08 | End: 2025-01-06

## 2024-10-08 NOTE — TELEPHONE ENCOUNTER
Medication Question or Refill        What medication are you calling about (include dose and sig)?:     atorvastatin (LIPITOR) 40 MG tablet  40 mg, DAILY       Preferred Pharmacy:        [id number] - [place], [place] - [address] AT [facility] [address]  [address] [place] [address]  Phone: [phone] Fax: [phone]      Controlled Substance Agreement on file:   CSA -- Patient Level:    CSA: None found at the patient level.       Who prescribed the medication?: it was Brady Chavez MD, however, Ami no longer goes there, Please have the PCP fill this one from now on    Do you need a refill? Yes    When did you use the medication last? 10/5/2024    Patient offered an appointment? No was seen in clinic in September 2024    Do you have any questions or concerns?  No      Okay to leave a detailed message?: Yes at Cell number on file:    Telephone Information:   Mobile [phone]

## 2024-10-08 NOTE — TELEPHONE ENCOUNTER
Routing to provider to review and advise. Medication pended for review if appropriate.     Last OV 9/11/24.

## 2024-11-08 DIAGNOSIS — F64.0 TRANSGENDER PERSON ON HORMONE THERAPY: ICD-10-CM

## 2024-11-08 DIAGNOSIS — Z79.899 TRANSGENDER PERSON ON HORMONE THERAPY: ICD-10-CM

## 2024-11-08 RX ORDER — SPIRONOLACTONE 100 MG/1
100 TABLET, FILM COATED ORAL DAILY
Qty: 90 TABLET | Refills: 0 | Status: SHIPPED | OUTPATIENT
Start: 2024-11-08

## 2024-11-08 NOTE — TELEPHONE ENCOUNTER
Requested Medication:  Spironolactone 100 MG Tablet  Dose:   100 MG  Quantity:  90  Refills:  0    Take 1 tablet (100 mg) by mouth daily    Last seen at [facility]:  2/2024 - 1 yr  Next Appointment with Provider:  Visit date not found     Shagufta Vicente CMA

## 2025-01-13 DIAGNOSIS — E78.00 HYPERCHOLESTEROLEMIA: ICD-10-CM

## 2025-01-14 RX ORDER — ATORVASTATIN CALCIUM 40 MG/1
40 TABLET, FILM COATED ORAL DAILY
Qty: 90 TABLET | Refills: 0 | Status: SHIPPED | OUTPATIENT
Start: 2025-01-14

## 2025-01-21 ENCOUNTER — TELEPHONE (OUTPATIENT)
Dept: FAMILY MEDICINE | Facility: CLINIC | Age: 75
End: 2025-01-21
Payer: COMMERCIAL

## 2025-01-21 DIAGNOSIS — Z79.899 TRANSGENDER PERSON ON HORMONE THERAPY: ICD-10-CM

## 2025-01-21 DIAGNOSIS — F64.0 TRANSGENDER PERSON ON HORMONE THERAPY: ICD-10-CM

## 2025-01-21 RX ORDER — ESTRADIOL 0.1 MG/D
1 FILM, EXTENDED RELEASE TRANSDERMAL
Qty: 24 PATCH | Refills: 2 | Status: SHIPPED | OUTPATIENT
Start: 2025-01-23

## 2025-01-21 NOTE — TELEPHONE ENCOUNTER
Medication Question or Refill        What medication are you calling about (include dose and sig)?: estradiol (MINIVELLE) 0.1 MG/24HR bi-weekly patch     Preferred Pharmacy:   [facility]  [address]  [facility] [address]  Phone: [phone] Fax: [phone]    [facility] - [place], [place] - [address]  [address]  [facility] [address]  Phone: [phone] Fax: [phone]    [id number] - [place], [place] - [address] AT [facility] [address]  [address]  [facility] [address]  Phone: [phone] Fax: [phone]      Controlled Substance Agreement on file:   CSA -- Patient Level:    CSA: None found at the patient level.       Who prescribed the medication?: Dr. De Los Santos      Do you need a refill? Yes    When did you use the medication last? 12/27/2024 - changes this patch on 1/24/2025        Do you have any questions or concerns?  Yes: this one does not stick well and it is leaving a rash - she had been using Vivelle patches and those worked better by [facility] was a little snotty about this script and they have to special order, she is willing use the [facility]      Okay to leave a detailed message?: Yes at Home number on file [phone] (home)   
Transferring RX.    estradiol (MINIVELLE) 0.1 MG/24HR bi-weekly patch 1 patch, TWICE WEEKLY      
Passed

## 2025-02-11 DIAGNOSIS — F64.0 TRANSGENDER PERSON ON HORMONE THERAPY: ICD-10-CM

## 2025-02-11 DIAGNOSIS — Z79.899 TRANSGENDER PERSON ON HORMONE THERAPY: ICD-10-CM

## 2025-02-11 RX ORDER — SPIRONOLACTONE 100 MG/1
100 TABLET, FILM COATED ORAL DAILY
Qty: 90 TABLET | Refills: 0 | Status: SHIPPED | OUTPATIENT
Start: 2025-02-11

## 2025-04-21 DIAGNOSIS — E78.00 HYPERCHOLESTEROLEMIA: ICD-10-CM

## 2025-04-22 RX ORDER — ATORVASTATIN CALCIUM 40 MG/1
40 TABLET, FILM COATED ORAL DAILY
Qty: 90 TABLET | Refills: 0 | Status: SHIPPED | OUTPATIENT
Start: 2025-04-22

## 2025-05-01 ENCOUNTER — PATIENT OUTREACH (OUTPATIENT)
Dept: FAMILY MEDICINE | Facility: CLINIC | Age: 75
End: 2025-05-01
Payer: COMMERCIAL

## 2025-05-01 NOTE — TELEPHONE ENCOUNTER
Patient Quality Outreach    Patient is due for the following:   Physical Annual Wellness Visit    Action(s) Taken:   Patient declined follow up at this time.    Type of outreach:    Phone, spoke to patient/parent. Patient/parent will call back to schedule.    Questions for provider review:    None         Ellyn Padgett MA  Chart routed to None.

## 2025-05-15 DIAGNOSIS — F64.0 TRANSGENDER PERSON ON HORMONE THERAPY: ICD-10-CM

## 2025-05-15 DIAGNOSIS — Z79.899 TRANSGENDER PERSON ON HORMONE THERAPY: ICD-10-CM

## 2025-05-20 RX ORDER — SPIRONOLACTONE 100 MG/1
100 TABLET, FILM COATED ORAL DAILY
Qty: 90 TABLET | Refills: 0 | Status: SHIPPED | OUTPATIENT
Start: 2025-05-20

## 2025-08-12 ENCOUNTER — PATIENT OUTREACH (OUTPATIENT)
Dept: CARE COORDINATION | Facility: CLINIC | Age: 75
End: 2025-08-12
Payer: COMMERCIAL

## 2025-08-22 ENCOUNTER — TELEPHONE (OUTPATIENT)
Dept: FAMILY MEDICINE | Facility: CLINIC | Age: 75
End: 2025-08-22
Payer: COMMERCIAL